# Patient Record
Sex: MALE | Race: WHITE | NOT HISPANIC OR LATINO | Employment: OTHER
[De-identification: names, ages, dates, MRNs, and addresses within clinical notes are randomized per-mention and may not be internally consistent; named-entity substitution may affect disease eponyms.]

---

## 2019-11-03 ENCOUNTER — HEALTH MAINTENANCE LETTER (OUTPATIENT)
Age: 54
End: 2019-11-03

## 2020-11-16 ENCOUNTER — HEALTH MAINTENANCE LETTER (OUTPATIENT)
Age: 55
End: 2020-11-16

## 2021-09-18 ENCOUNTER — HEALTH MAINTENANCE LETTER (OUTPATIENT)
Age: 56
End: 2021-09-18

## 2022-01-08 ENCOUNTER — HEALTH MAINTENANCE LETTER (OUTPATIENT)
Age: 57
End: 2022-01-08

## 2022-11-19 ENCOUNTER — HEALTH MAINTENANCE LETTER (OUTPATIENT)
Age: 57
End: 2022-11-19

## 2023-04-15 ENCOUNTER — HEALTH MAINTENANCE LETTER (OUTPATIENT)
Age: 58
End: 2023-04-15

## 2023-08-11 ENCOUNTER — LAB REQUISITION (OUTPATIENT)
Dept: LAB | Facility: CLINIC | Age: 58
End: 2023-08-11

## 2023-08-11 DIAGNOSIS — Z12.5 ENCOUNTER FOR SCREENING FOR MALIGNANT NEOPLASM OF PROSTATE: ICD-10-CM

## 2023-08-11 DIAGNOSIS — Z13.220 ENCOUNTER FOR SCREENING FOR LIPOID DISORDERS: ICD-10-CM

## 2023-08-11 DIAGNOSIS — Z13.1 ENCOUNTER FOR SCREENING FOR DIABETES MELLITUS: ICD-10-CM

## 2023-08-11 LAB
ALBUMIN SERPL BCG-MCNC: 4.3 G/DL (ref 3.5–5.2)
ALP SERPL-CCNC: 69 U/L (ref 40–129)
ALT SERPL W P-5'-P-CCNC: 19 U/L (ref 0–70)
ANION GAP SERPL CALCULATED.3IONS-SCNC: 12 MMOL/L (ref 7–15)
AST SERPL W P-5'-P-CCNC: 22 U/L (ref 0–45)
BILIRUB SERPL-MCNC: 1 MG/DL
BUN SERPL-MCNC: 12.3 MG/DL (ref 8–23)
CALCIUM SERPL-MCNC: 9.3 MG/DL (ref 8.6–10)
CHLORIDE SERPL-SCNC: 103 MMOL/L (ref 98–107)
CHOLEST SERPL-MCNC: 151 MG/DL
CREAT SERPL-MCNC: 0.92 MG/DL (ref 0.67–1.17)
DEPRECATED HCO3 PLAS-SCNC: 24 MMOL/L (ref 22–29)
GFR SERPL CREATININE-BSD FRML MDRD: >90 ML/MIN/1.73M2
GLUCOSE SERPL-MCNC: 97 MG/DL (ref 70–99)
HDLC SERPL-MCNC: 33 MG/DL
LDLC SERPL CALC-MCNC: 96 MG/DL
NONHDLC SERPL-MCNC: 118 MG/DL
POTASSIUM SERPL-SCNC: 4.2 MMOL/L (ref 3.4–5.3)
PROT SERPL-MCNC: 6.2 G/DL (ref 6.4–8.3)
PSA SERPL DL<=0.01 NG/ML-MCNC: 0.6 NG/ML (ref 0–3.5)
SODIUM SERPL-SCNC: 139 MMOL/L (ref 136–145)
TRIGL SERPL-MCNC: 110 MG/DL

## 2023-08-11 PROCEDURE — 80053 COMPREHEN METABOLIC PANEL: CPT | Performed by: FAMILY MEDICINE

## 2023-08-11 PROCEDURE — 80061 LIPID PANEL: CPT | Performed by: FAMILY MEDICINE

## 2023-08-11 PROCEDURE — G0103 PSA SCREENING: HCPCS | Performed by: FAMILY MEDICINE

## 2024-01-22 ENCOUNTER — OFFICE VISIT (OUTPATIENT)
Dept: INTERNAL MEDICINE | Facility: CLINIC | Age: 59
End: 2024-01-22
Payer: COMMERCIAL

## 2024-01-22 VITALS
HEIGHT: 72 IN | OXYGEN SATURATION: 100 % | HEART RATE: 63 BPM | TEMPERATURE: 98 F | RESPIRATION RATE: 10 BRPM | BODY MASS INDEX: 26.01 KG/M2 | WEIGHT: 192 LBS | DIASTOLIC BLOOD PRESSURE: 64 MMHG | SYSTOLIC BLOOD PRESSURE: 122 MMHG

## 2024-01-22 DIAGNOSIS — H92.02 LEFT EAR PAIN: Primary | ICD-10-CM

## 2024-01-22 DIAGNOSIS — H93.8X2 EAR CONGESTION, LEFT: ICD-10-CM

## 2024-01-22 PROCEDURE — 99203 OFFICE O/P NEW LOW 30 MIN: CPT | Performed by: INTERNAL MEDICINE

## 2024-01-22 RX ORDER — FLUTICASONE PROPIONATE 50 MCG
2 SPRAY, SUSPENSION (ML) NASAL DAILY
Qty: 16 G | Refills: 0 | Status: SHIPPED | OUTPATIENT
Start: 2024-01-22 | End: 2024-02-13

## 2024-01-22 RX ORDER — PSEUDOEPHEDRINE HCL 30 MG
30 TABLET ORAL EVERY 4 HOURS PRN
Qty: 30 TABLET | Refills: 0 | Status: SHIPPED | OUTPATIENT
Start: 2024-01-22 | End: 2024-06-24

## 2024-01-22 NOTE — PROGRESS NOTES
1. Left ear pain  Possibly some eustachian tube dysfunction here.  Trial of Sudafed and fluticasone and see if we can get things feeling better for him.  Will have him meet with ENT if things are not improving.  - Adult ENT  Referral; Future  - pseudoePHEDrine (SUDAFED) 30 MG tablet; Take 1 tablet (30 mg) by mouth every 4 hours as needed for congestion  Dispense: 30 tablet; Refill: 0  - fluticasone (FLONASE) 50 MCG/ACT nasal spray; Spray 2 sprays into both nostrils daily  Dispense: 16 g; Refill: 0    2. Ear congestion, left  As above.  - Adult ENT  Referral; Future  - pseudoePHEDrine (SUDAFED) 30 MG tablet; Take 1 tablet (30 mg) by mouth every 4 hours as needed for congestion  Dispense: 30 tablet; Refill: 0  - fluticasone (FLONASE) 50 MCG/ACT nasal spray; Spray 2 sprays into both nostrils daily  Dispense: 16 g; Refill: 0     Cecil   Simon is a 58 year old, presenting for the following health issues:  Ear Problem (Left ear fullness(use otc ear wax removal)  )      1/22/2024    12:03 PM   Additional Questions   Roomed by MAGO Brody   Accompanied by Self     Ear Problem    History of Present Illness       Reason for visit:  Ear plugged  Symptom onset:  3-4 weeks ago    He eats 4 or more servings of fruits and vegetables daily.He consumes 0 sweetened beverage(s) daily.He exercises with enough effort to increase his heart rate 60 or more minutes per day.  He exercises with enough effort to increase his heart rate 6 days per week.   He is taking medications regularly.       This is a new patient to the clinic.  He recently moved back from [place].  Apparently did see someone over an [place] last year.  He is here today as an urgent add-on for evaluation of left ear pain.  It has been very minimally painful for the last month around his left ear.  He thought maybe he had some wax and tried some wax removal drops.  He also has had some congestion that started with cold about a month ago.  That is  been bothering him.  He notes some diminished hearing on that side.  There is no drainage.  Denies other somatic concerns.  He has been pretty healthy.  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.  For the most part he is retired though.  His family owned the [employer] business.  He has grandchildren here in town which brought him back to the [place]              Objective    /64 (BP Location: Left arm, Patient Position: Sitting, Cuff Size: Adult Regular)   Pulse 63   Temp 98  F (36.7  C) (Tympanic)   Resp 10   Ht 1.829 m (6')   Wt 87.1 kg (192 lb)   SpO2 100%   BMI 26.04 kg/m    Body mass index is 26.04 kg/m .  Physical Exam     Pleasant gentleman.  Little cerumen on the right.  Tympanic membrane look normal.  Left auditory canal normal.  Tympanic membrane is retracted and slightly erythematous.  No evidence of middle ear effusion or purulent effusion.          Signed Electronically by: LIZETTE APARICIO MD

## 2024-02-13 DIAGNOSIS — H92.02 LEFT EAR PAIN: ICD-10-CM

## 2024-02-13 DIAGNOSIS — H93.8X2 EAR CONGESTION, LEFT: ICD-10-CM

## 2024-02-13 RX ORDER — FLUTICASONE PROPIONATE 50 MCG
2 SPRAY, SUSPENSION (ML) NASAL DAILY
Qty: 16 ML | Refills: 0 | Status: SHIPPED | OUTPATIENT
Start: 2024-02-13 | End: 2024-03-04

## 2024-02-16 ENCOUNTER — TELEPHONE (OUTPATIENT)
Dept: INTERNAL MEDICINE | Facility: CLINIC | Age: 59
End: 2024-02-16
Payer: COMMERCIAL

## 2024-02-16 NOTE — TELEPHONE ENCOUNTER
General Call    Contacts         Type Contact Phone/Fax    02/16/2024 10:15 AM CST Phone (Incoming) Elissa Simon Crow (Self) [phone] (H)     Requesting ENT referral be sent to [facility]          Reason for Call:  Referral Request    What are your questions or concerns: Requesting ENT referral be sent to [facility]. Simon was able to find an appointment with sooner timeframes    [facility]  Phone: [phone]  FAX: Unknown    Date of last appointment with provider: 1/22/24    Could we [facility] this information to you [facility] or would you prefer to receive a phone call?:   Patient would prefer a phone call   Okay to leave a detailed message?: Yes at Cell number on file:    Telephone Information:   Mobile [phone]

## 2024-03-03 DIAGNOSIS — H92.02 LEFT EAR PAIN: ICD-10-CM

## 2024-03-03 DIAGNOSIS — H93.8X2 EAR CONGESTION, LEFT: ICD-10-CM

## 2024-03-04 RX ORDER — FLUTICASONE PROPIONATE 50 MCG
2 SPRAY, SUSPENSION (ML) NASAL DAILY
Qty: 16 ML | Refills: 0 | Status: SHIPPED | OUTPATIENT
Start: 2024-03-04 | End: 2024-06-24

## 2024-03-18 DIAGNOSIS — H92.02 LEFT EAR PAIN: ICD-10-CM

## 2024-03-18 DIAGNOSIS — H93.8X2 EAR CONGESTION, LEFT: ICD-10-CM

## 2024-03-19 ENCOUNTER — OFFICE VISIT (OUTPATIENT)
Dept: URGENT CARE | Facility: URGENT CARE | Age: 59
End: 2024-03-19
Payer: COMMERCIAL

## 2024-03-19 VITALS
RESPIRATION RATE: 20 BRPM | SYSTOLIC BLOOD PRESSURE: 130 MMHG | DIASTOLIC BLOOD PRESSURE: 87 MMHG | OXYGEN SATURATION: 98 % | TEMPERATURE: 98 F | HEART RATE: 72 BPM

## 2024-03-19 DIAGNOSIS — H53.451 LOSS OF PERIPHERAL VISUAL FIELD, RIGHT: ICD-10-CM

## 2024-03-19 DIAGNOSIS — H43.391 FLOATERS IN VISUAL FIELD, RIGHT: Primary | ICD-10-CM

## 2024-03-19 PROCEDURE — 99214 OFFICE O/P EST MOD 30 MIN: CPT | Performed by: PHYSICIAN ASSISTANT

## 2024-03-19 RX ORDER — FLUTICASONE PROPIONATE 50 MCG
2 SPRAY, SUSPENSION (ML) NASAL DAILY
Qty: 24 ML | Refills: 1 | OUTPATIENT
Start: 2024-03-19

## 2024-03-19 NOTE — PROGRESS NOTES
Assessment & Plan     Floaters in visual field, right    Patient was on a trip to [place] and noticed right eye visual field loss  He has also noticed floaters in his visual field  Visual field deficit in right medial lower field or right eye    There is concern for retinal detachment and secondary floaters  I have made an appt with ophthalmology for visit today to have slit lamp    - Adult Eye  Referral; Future    Loss of peripheral visual field, right    Referral for emergent eye exam due to concern of retinal tear    - Adult Eye  Referral; Future    Review of external notes as documented elsewhere in note  BMI  Estimated body mass index is 26.04 kg/m  as calculated from the following:    Height as of 1/22/24: 1.829 m (6').    Weight as of 1/22/24: 87.1 kg (192 lb).       CONSULTATION/REFERRAL to Ophthalmology today     No follow-ups on file.    Cecil Montes is a 58 year old, presenting for the following health issues:  Urgent Care (R eye blurry /Pt see black spots/Pt does not use glasses /R 20/20 L20/20 Both 20/20 Normal /X1wk )    HPI     Review of Systems  Constitutional, HEENT, cardiovascular, pulmonary, gi and gu systems are negative, except as otherwise noted.      Objective    /87   Pulse 72   Temp 98  F (36.7  C) (Tympanic)   Resp 20   SpO2 98%   There is no height or weight on file to calculate BMI.  Physical Exam   GENERAL: alert and no distress  EYES: Eyes grossly normal to inspection, PERRL and conjunctivae and sclerae normal  SKIN: no suspicious lesions or rashes  NEURO: Normal strength and tone, mentation intact and speech normal  PSYCH: mentation appears normal, affect normal/bright            Signed Electronically by: Demarcus Dior, [facility], PA-C

## 2024-03-19 NOTE — PATIENT INSTRUCTIONS
[facility], [address], [address], [place], [place], [facility]    [phone]    APT TIME 2:10 pm

## 2024-03-19 NOTE — PROGRESS NOTES
[facility], [address], [address], [place], [place], [facility]    [phone] 2:10 pm

## 2024-03-20 ENCOUNTER — TELEPHONE (OUTPATIENT)
Dept: OPHTHALMOLOGY | Facility: CLINIC | Age: 59
End: 2024-03-20
Payer: COMMERCIAL

## 2024-03-21 ENCOUNTER — ANESTHESIA EVENT (OUTPATIENT)
Dept: SURGERY | Facility: CLINIC | Age: 59
End: 2024-03-21
Payer: COMMERCIAL

## 2024-03-21 ENCOUNTER — HOSPITAL ENCOUNTER (OUTPATIENT)
Facility: CLINIC | Age: 59
Discharge: HOME | End: 2024-03-21
Attending: OPHTHALMOLOGY | Admitting: OPHTHALMOLOGY
Payer: COMMERCIAL

## 2024-03-21 ENCOUNTER — ANESTHESIA (OUTPATIENT)
Dept: SURGERY | Facility: CLINIC | Age: 59
End: 2024-03-21
Payer: COMMERCIAL

## 2024-03-21 VITALS
OXYGEN SATURATION: 99 % | DIASTOLIC BLOOD PRESSURE: 79 MMHG | HEART RATE: 68 BPM | HEIGHT: 73 IN | BODY MASS INDEX: 24.86 KG/M2 | WEIGHT: 187.61 LBS | RESPIRATION RATE: 14 BRPM | TEMPERATURE: 98.4 F | SYSTOLIC BLOOD PRESSURE: 124 MMHG

## 2024-03-21 DIAGNOSIS — H33.21 RIGHT RETINAL DETACHMENT: ICD-10-CM

## 2024-03-21 DIAGNOSIS — Z48.810 AFTERCARE FOLLOWING SURGERY OF A SENSE ORGAN: Primary | ICD-10-CM

## 2024-03-21 PROCEDURE — 360N000077 HC SURGERY LEVEL 4, PER MIN: Performed by: OPHTHALMOLOGY

## 2024-03-21 PROCEDURE — 250N000013 HC RX MED GY IP 250 OP 250 PS 637: Performed by: OPHTHALMOLOGY

## 2024-03-21 PROCEDURE — 250N000011 HC RX IP 250 OP 636: Performed by: NURSE ANESTHETIST, CERTIFIED REGISTERED

## 2024-03-21 PROCEDURE — 67108 REPAIR DETACHED RETINA: CPT | Performed by: NURSE ANESTHETIST, CERTIFIED REGISTERED

## 2024-03-21 PROCEDURE — 250N000009 HC RX 250: Performed by: NURSE ANESTHETIST, CERTIFIED REGISTERED

## 2024-03-21 PROCEDURE — 710N000010 HC RECOVERY PHASE 1, LEVEL 2, PER MIN: Performed by: OPHTHALMOLOGY

## 2024-03-21 PROCEDURE — 250N000025 HC SEVOFLURANE, PER MIN: Performed by: OPHTHALMOLOGY

## 2024-03-21 PROCEDURE — 67108 REPAIR DETACHED RETINA: CPT | Mod: RT | Performed by: OPHTHALMOLOGY

## 2024-03-21 PROCEDURE — 250N000011 HC RX IP 250 OP 636: Performed by: OPHTHALMOLOGY

## 2024-03-21 PROCEDURE — 710N000012 HC RECOVERY PHASE 2, PER MINUTE: Performed by: OPHTHALMOLOGY

## 2024-03-21 PROCEDURE — 250N000009 HC RX 250: Performed by: OPHTHALMOLOGY

## 2024-03-21 PROCEDURE — 999N000141 HC STATISTIC PRE-PROCEDURE NURSING ASSESSMENT: Performed by: OPHTHALMOLOGY

## 2024-03-21 PROCEDURE — 272N000001 HC OR GENERAL SUPPLY STERILE: Performed by: OPHTHALMOLOGY

## 2024-03-21 PROCEDURE — 370N000017 HC ANESTHESIA TECHNICAL FEE, PER MIN: Performed by: OPHTHALMOLOGY

## 2024-03-21 PROCEDURE — 250N000009 HC RX 250: Performed by: STUDENT IN AN ORGANIZED HEALTH CARE EDUCATION/TRAINING PROGRAM

## 2024-03-21 PROCEDURE — C1784 OCULAR DEV, INTRAOP, DET RET: HCPCS | Performed by: OPHTHALMOLOGY

## 2024-03-21 PROCEDURE — 67108 REPAIR DETACHED RETINA: CPT | Performed by: ANESTHESIOLOGY

## 2024-03-21 PROCEDURE — 258N000003 HC RX IP 258 OP 636: Performed by: NURSE ANESTHETIST, CERTIFIED REGISTERED

## 2024-03-21 DEVICE — EYE IMP STRIP STYLE 4050 S4050: Type: IMPLANTABLE DEVICE | Site: EYE | Status: FUNCTIONAL

## 2024-03-21 DEVICE — EYE IMP SLEEVE OVAL STYLE 3084 S3084: Type: IMPLANTABLE DEVICE | Site: EYE | Status: FUNCTIONAL

## 2024-03-21 RX ORDER — ONDANSETRON 2 MG/ML
4 INJECTION INTRAMUSCULAR; INTRAVENOUS EVERY 30 MIN PRN
Status: DISCONTINUED | OUTPATIENT
Start: 2024-03-21 | End: 2024-03-26 | Stop reason: HOSPADM

## 2024-03-21 RX ORDER — PROPOFOL 10 MG/ML
INJECTION, EMULSION INTRAVENOUS PRN
Status: DISCONTINUED | OUTPATIENT
Start: 2024-03-21 | End: 2024-03-21

## 2024-03-21 RX ORDER — DEXAMETHASONE SODIUM PHOSPHATE 4 MG/ML
INJECTION, SOLUTION INTRA-ARTICULAR; INTRALESIONAL; INTRAMUSCULAR; INTRAVENOUS; SOFT TISSUE PRN
Status: DISCONTINUED | OUTPATIENT
Start: 2024-03-21 | End: 2024-03-21

## 2024-03-21 RX ORDER — BUPIVACAINE HYDROCHLORIDE 7.5 MG/ML
INJECTION, SOLUTION EPIDURAL; RETROBULBAR PRN
Status: DISCONTINUED | OUTPATIENT
Start: 2024-03-21 | End: 2024-03-21 | Stop reason: HOSPADM

## 2024-03-21 RX ORDER — FENTANYL CITRATE 50 UG/ML
25 INJECTION, SOLUTION INTRAMUSCULAR; INTRAVENOUS
Status: DISCONTINUED | OUTPATIENT
Start: 2024-03-21 | End: 2024-03-26 | Stop reason: HOSPADM

## 2024-03-21 RX ORDER — ATROPINE SULFATE 10 MG/ML
SOLUTION/ DROPS OPHTHALMIC PRN
Status: DISCONTINUED | OUTPATIENT
Start: 2024-03-21 | End: 2024-03-21 | Stop reason: HOSPADM

## 2024-03-21 RX ORDER — OFLOXACIN 3 MG/ML
1 SOLUTION/ DROPS OPHTHALMIC 4 TIMES DAILY
Qty: 5 ML | Refills: 0 | Status: SHIPPED | OUTPATIENT
Start: 2024-03-21 | End: 2024-06-24

## 2024-03-21 RX ORDER — HYDRALAZINE HYDROCHLORIDE 20 MG/ML
2.5-5 INJECTION INTRAMUSCULAR; INTRAVENOUS EVERY 10 MIN PRN
Status: DISCONTINUED | OUTPATIENT
Start: 2024-03-21 | End: 2024-03-26 | Stop reason: HOSPADM

## 2024-03-21 RX ORDER — FENTANYL CITRATE 50 UG/ML
25 INJECTION, SOLUTION INTRAMUSCULAR; INTRAVENOUS EVERY 5 MIN PRN
Status: DISCONTINUED | OUTPATIENT
Start: 2024-03-21 | End: 2024-03-26 | Stop reason: HOSPADM

## 2024-03-21 RX ORDER — SODIUM CHLORIDE, SODIUM LACTATE, POTASSIUM CHLORIDE, CALCIUM CHLORIDE 600; 310; 30; 20 MG/100ML; MG/100ML; MG/100ML; MG/100ML
INJECTION, SOLUTION INTRAVENOUS CONTINUOUS PRN
Status: DISCONTINUED | OUTPATIENT
Start: 2024-03-21 | End: 2024-03-21

## 2024-03-21 RX ORDER — POLYMYXIN B SULFATE AND TRIMETHOPRIM 1; 10000 MG/ML; [USP'U]/ML
SOLUTION OPHTHALMIC PRN
Status: DISCONTINUED | OUTPATIENT
Start: 2024-03-21 | End: 2024-03-21 | Stop reason: HOSPADM

## 2024-03-21 RX ORDER — OXYCODONE HYDROCHLORIDE 5 MG/1
5 TABLET ORAL EVERY 4 HOURS PRN
Status: DISCONTINUED | OUTPATIENT
Start: 2024-03-21 | End: 2024-03-26 | Stop reason: HOSPADM

## 2024-03-21 RX ORDER — PROPARACAINE HYDROCHLORIDE 5 MG/ML
1 SOLUTION/ DROPS OPHTHALMIC ONCE
Status: DISCONTINUED | OUTPATIENT
Start: 2024-03-21 | End: 2024-03-21 | Stop reason: HOSPADM

## 2024-03-21 RX ORDER — PREDNISOLONE ACETATE 10 MG/ML
1 SUSPENSION/ DROPS OPHTHALMIC 4 TIMES DAILY
Qty: 5 ML | Refills: 1 | Status: SHIPPED | OUTPATIENT
Start: 2024-03-21 | End: 2024-06-24

## 2024-03-21 RX ORDER — OXYCODONE HYDROCHLORIDE 10 MG/1
10 TABLET ORAL EVERY 4 HOURS PRN
Status: DISCONTINUED | OUTPATIENT
Start: 2024-03-21 | End: 2024-03-26 | Stop reason: HOSPADM

## 2024-03-21 RX ORDER — SODIUM CHLORIDE, SODIUM LACTATE, POTASSIUM CHLORIDE, CALCIUM CHLORIDE 600; 310; 30; 20 MG/100ML; MG/100ML; MG/100ML; MG/100ML
INJECTION, SOLUTION INTRAVENOUS CONTINUOUS
Status: DISCONTINUED | OUTPATIENT
Start: 2024-03-21 | End: 2024-03-26 | Stop reason: HOSPADM

## 2024-03-21 RX ORDER — HYDROMORPHONE HYDROCHLORIDE 1 MG/ML
0.4 INJECTION, SOLUTION INTRAMUSCULAR; INTRAVENOUS; SUBCUTANEOUS EVERY 5 MIN PRN
Status: DISCONTINUED | OUTPATIENT
Start: 2024-03-21 | End: 2024-03-26 | Stop reason: HOSPADM

## 2024-03-21 RX ORDER — NALOXONE HYDROCHLORIDE 0.4 MG/ML
0.1 INJECTION, SOLUTION INTRAMUSCULAR; INTRAVENOUS; SUBCUTANEOUS
Status: DISCONTINUED | OUTPATIENT
Start: 2024-03-21 | End: 2024-03-26 | Stop reason: HOSPADM

## 2024-03-21 RX ORDER — CYCLOPENTOLAT/TROPIC/PHENYLEPH 1%-1%-2.5%
1 DROPS (EA) OPHTHALMIC (EYE)
Status: DISCONTINUED | OUTPATIENT
Start: 2024-03-21 | End: 2024-03-21 | Stop reason: HOSPADM

## 2024-03-21 RX ORDER — ONDANSETRON 2 MG/ML
INJECTION INTRAMUSCULAR; INTRAVENOUS PRN
Status: DISCONTINUED | OUTPATIENT
Start: 2024-03-21 | End: 2024-03-21

## 2024-03-21 RX ORDER — HYDROMORPHONE HYDROCHLORIDE 1 MG/ML
0.2 INJECTION, SOLUTION INTRAMUSCULAR; INTRAVENOUS; SUBCUTANEOUS EVERY 5 MIN PRN
Status: DISCONTINUED | OUTPATIENT
Start: 2024-03-21 | End: 2024-03-26 | Stop reason: HOSPADM

## 2024-03-21 RX ORDER — FENTANYL CITRATE 50 UG/ML
50 INJECTION, SOLUTION INTRAMUSCULAR; INTRAVENOUS EVERY 5 MIN PRN
Status: DISCONTINUED | OUTPATIENT
Start: 2024-03-21 | End: 2024-03-26 | Stop reason: HOSPADM

## 2024-03-21 RX ORDER — FENTANYL CITRATE 50 UG/ML
INJECTION, SOLUTION INTRAMUSCULAR; INTRAVENOUS PRN
Status: DISCONTINUED | OUTPATIENT
Start: 2024-03-21 | End: 2024-03-21

## 2024-03-21 RX ORDER — LIDOCAINE HYDROCHLORIDE 20 MG/ML
INJECTION, SOLUTION INFILTRATION; PERINEURAL PRN
Status: DISCONTINUED | OUTPATIENT
Start: 2024-03-21 | End: 2024-03-21

## 2024-03-21 RX ORDER — METOPROLOL TARTRATE 1 MG/ML
1-2 INJECTION, SOLUTION INTRAVENOUS EVERY 5 MIN PRN
Status: DISCONTINUED | OUTPATIENT
Start: 2024-03-21 | End: 2024-03-26 | Stop reason: HOSPADM

## 2024-03-21 RX ORDER — DEXMEDETOMIDINE HYDROCHLORIDE 4 UG/ML
INJECTION, SOLUTION INTRAVENOUS PRN
Status: DISCONTINUED | OUTPATIENT
Start: 2024-03-21 | End: 2024-03-21

## 2024-03-21 RX ORDER — ONDANSETRON 4 MG/1
4 TABLET, ORALLY DISINTEGRATING ORAL EVERY 30 MIN PRN
Status: DISCONTINUED | OUTPATIENT
Start: 2024-03-21 | End: 2024-03-26 | Stop reason: HOSPADM

## 2024-03-21 RX ORDER — KETOROLAC TROMETHAMINE 30 MG/ML
INJECTION, SOLUTION INTRAMUSCULAR; INTRAVENOUS PRN
Status: DISCONTINUED | OUTPATIENT
Start: 2024-03-21 | End: 2024-03-21

## 2024-03-21 RX ORDER — CYCLOPENTOLAT/TROPIC/PHENYLEPH 1%-1%-2.5%
DROPS (EA) OPHTHALMIC (EYE) PRN
Status: DISCONTINUED | OUTPATIENT
Start: 2024-03-21 | End: 2024-03-21 | Stop reason: HOSPADM

## 2024-03-21 RX ORDER — BALANCED SALT SOLUTION 6.4; .75; .48; .3; 3.9; 1.7 MG/ML; MG/ML; MG/ML; MG/ML; MG/ML; MG/ML
SOLUTION OPHTHALMIC PRN
Status: DISCONTINUED | OUTPATIENT
Start: 2024-03-21 | End: 2024-03-21 | Stop reason: HOSPADM

## 2024-03-21 RX ADMIN — LIDOCAINE HYDROCHLORIDE 100 MG: 20 INJECTION, SOLUTION INFILTRATION; PERINEURAL at 11:49

## 2024-03-21 RX ADMIN — PHENYLEPHRINE HYDROCHLORIDE 100 MCG: 10 INJECTION INTRAVENOUS at 12:10

## 2024-03-21 RX ADMIN — Medication 1 DROP: at 11:38

## 2024-03-21 RX ADMIN — PHENYLEPHRINE HYDROCHLORIDE 100 MCG: 10 INJECTION INTRAVENOUS at 12:05

## 2024-03-21 RX ADMIN — PROPOFOL 150 MG: 10 INJECTION, EMULSION INTRAVENOUS at 11:49

## 2024-03-21 RX ADMIN — FENTANYL CITRATE 25 MCG: 50 INJECTION INTRAMUSCULAR; INTRAVENOUS at 12:46

## 2024-03-21 RX ADMIN — DEXMEDETOMIDINE 4 MCG: 100 INJECTION, SOLUTION, CONCENTRATE INTRAVENOUS at 11:41

## 2024-03-21 RX ADMIN — DEXAMETHASONE SODIUM PHOSPHATE 6 MG: 4 INJECTION, SOLUTION INTRA-ARTICULAR; INTRALESIONAL; INTRAMUSCULAR; INTRAVENOUS; SOFT TISSUE at 11:49

## 2024-03-21 RX ADMIN — KETOROLAC TROMETHAMINE 30 MG: 30 INJECTION, SOLUTION INTRAMUSCULAR at 11:49

## 2024-03-21 RX ADMIN — FENTANYL CITRATE 25 MCG: 50 INJECTION INTRAMUSCULAR; INTRAVENOUS at 12:22

## 2024-03-21 RX ADMIN — DEXMEDETOMIDINE 4 MCG: 100 INJECTION, SOLUTION, CONCENTRATE INTRAVENOUS at 12:58

## 2024-03-21 RX ADMIN — SODIUM CHLORIDE, POTASSIUM CHLORIDE, SODIUM LACTATE AND CALCIUM CHLORIDE: 600; 310; 30; 20 INJECTION, SOLUTION INTRAVENOUS at 11:33

## 2024-03-21 RX ADMIN — FENTANYL CITRATE 50 MCG: 50 INJECTION INTRAMUSCULAR; INTRAVENOUS at 11:48

## 2024-03-21 RX ADMIN — Medication 1 DROP: at 11:36

## 2024-03-21 RX ADMIN — MIDAZOLAM 2 MG: 1 INJECTION INTRAMUSCULAR; INTRAVENOUS at 11:40

## 2024-03-21 RX ADMIN — ONDANSETRON 4 MG: 2 INJECTION INTRAMUSCULAR; INTRAVENOUS at 13:49

## 2024-03-21 RX ADMIN — PROPOFOL 50 MG: 10 INJECTION, EMULSION INTRAVENOUS at 11:52

## 2024-03-21 ASSESSMENT — ACTIVITIES OF DAILY LIVING (ADL)
ADLS_ACUITY_SCORE: 35

## 2024-03-21 NOTE — DISCHARGE INSTRUCTIONS
POST-OPERATIVE INSTRUCTIONS FOLLOWING RETINA SURGERY    Carissa Hernandez MD, PhD  Department of Ophthalmology  [facility]  [phone]      ACTIVITY:  No heavy lifting after surgery  Keep the bandage in place until you are seen tomorrow at the Eye Clinic in the [facility] ([facility]), on the 9th floor.  The clinic street address for [facility] is [address] [place] [address]  The [place] patient parking garage is 1 block away at [address] [facility].  Do not get your bandage wet.      GAS BUBBLE POSITIONING REQUIREMENTS  Positioning requirements will be discussed with you if you have an oil or gas bubble in your eye:    Position:    Face Down    The duration  will be confirmed at the clinic visit tomorrow.    Sleeping face down can be challenging.  One method is to sleep on your stomach with your head hanging over the edge of the bed with a chair there to rest your head on.  Alternatively, you can sleep with your chest laying on top of a large pile of pillows with your head hanging over.  Alternatively, you can place 2 rows of firm pillows side by side with a gap in between.  Sleep on top of the pillows with your head in the gap.  Alternatively, it is also possible to rent positioning equipment from medical supply stores. This typically costs $150-200 per week. Insurance usually does not cover the cost.  Often, I have found patients prefer the pillows they set up themselves to the rented equipment.    When positioning, it is OK to take brief breaks to eat, stretch, clean up, etc.  Try to position for 90% of the time (5 minute break per hour on average).  Do not lay flat on your back until the bubble is gone.  Do not fly on an airplane or travel to elevations more than 1000 feet above [place] until the bubble is gone.  Keep the green bracelet on your wrist until the bubble is gone.  If it breaks, we can give you a replacement        EYE DROPS  Drops will be given to you after the  surgery.  They DO NOT need to be used until after you are seen in clinic.  DO NOT disturb your bandage the first night.      When using more than one drop, separate them by 3 minutes between drops.  Common times to place drops are breakfast, lunch, dinner, and bedtime.  Do not stop your drops without discussing with our office.  If you run out before your appointment, call and we will send in a refill.    ofloxacin --- 4 times per day  Pred Forte (prednisolone acetate) --- 4 times per day      PAIN MEDICATION   It is common to have some mild or moderate discomfort after eye surgery.  Tylenol or ibuprofen may be taken if you don't have any other general health conditions that prevent you from taking these.      WHAT TO EXPECT  It is common for the eye to to have a blood tinged discharge for a few days after surgery  It may feel irritated (as if something were in your eye), for there to be clear discharge (thicker in the mornings upon awakening), and for it to be bloodshot for 2-3 weeks following retina surgery.   Your vision is also commonly decreased during this time due to the bubble.          WHAT TO WATCH OUT FOR  If you experience any of the following, you should call immediately:  Increasing pain  Increasing nausea or vomiting  Increasing redness  Worsening or darkening of the vision  New flashing lights or floaters      For any of the symptoms listed above, or for other concerns, call [phone] and ask to speak to the clinic nurse.  If you call after hours, follow to prompts to reach the doctor on call.          Same-Day Surgery   Adult Discharge Orders & Instructions     For 24 hours after surgery:  Get plenty of rest.  A responsible adult must stay with you for at least 24 hours after you leave the hospital.   Pain medication can slow your reflexes. Do not drive or use heavy equipment.  If you have weakness or tingling, don't drive or use heavy equipment until this feeling goes away.  Mixing alcohol and  pain medication can cause dizziness and slow your breathing. It can even be fatal. Do not drink alcohol while taking pain medication.  Avoid strenuous or risky activities.  Ask for help when climbing stairs.   You may feel lightheaded.  If so, sit for a few minutes before standing.  Have someone help you get up.   If you have nausea (feel sick to your stomach), drink only clear liquids such as apple juice, ginger ale, broth or 7-Up.  Rest may also help.  Be sure to drink enough fluids.  Move to a regular diet as you feel able. Take pain medications with a small amount of solid food, such as toast or crackers, to avoid nausea.   A slight fever is normal. Call the doctor if your fever is over 100 F (37.7 C) (taken under the tongue) or lasts longer than 24 hours.  You may have a dry mouth, muscle aches, trouble sleeping or a sore throat.  These symptoms should go away after 24 hours.  Do not make important or legal decisions.   Pain Management:      1. Take pain medication (if prescribed) for pain as directed by your physician.        2. WARNING: If the pain medication you have been prescribed contains Tylenol  (acetaminophen), DO NOT take additional doses of Tylenol (acetaminophen).     Call your doctor for any of the followin.  Signs of infection (fever, growing tenderness at the surgery site, severe pain, a large amount of drainage or bleeding, foul-smelling drainage, redness, swelling).    2.  It has been over 8 to 10 hours since surgery and you are still not able to urinate (pee).    3.  Headache for over 24 hours.    4.  Numbness, tingling or weakness the day after surgery (if you had spinal anesthesia).  To contact a doctor, call _______Dr. Hernandez, Eye Clinic at [phone] ______________________________ or:  '   [phone] and ask for the Resident On Call for:          _________Ophthamology_________________________________ (answered 24 hours a day)  '   Emergency Department:  [facility]  [facility]: [phone]  [place] [facility]: [phone]

## 2024-03-21 NOTE — ANESTHESIA POSTPROCEDURE EVALUATION
Patient: Simon Bowers    Procedure: Procedure(s):  Vitrectomy parsplana with 25 gauge system, Endolaser, SF6 Gas bubble infusion  Scleral buckle       Anesthesia Type:  General    Note:  Disposition: Outpatient   Postop Pain Control: Uneventful            Sign Out: Well controlled pain   PONV: No   Neuro/Psych: Uneventful            Sign Out: Acceptable/Baseline neuro status   Airway/Respiratory: Uneventful            Sign Out: Acceptable/Baseline resp. status   CV/Hemodynamics: Uneventful            Sign Out: Acceptable CV status; No obvious hypovolemia; No obvious fluid overload   Other NRE: NONE   DID A NON-ROUTINE EVENT OCCUR? No       Last vitals:  Vitals Value Taken Time   BP 97/52 03/21/24 1435   Temp 37  C (98.6  F) 03/21/24 1404   Pulse 72 03/21/24 1435   Resp 11 03/21/24 1423   SpO2 97 % 03/21/24 1443   Vitals shown include unfiled device data.    Electronically Signed By: Mary Kate Otto MD  March 21, 2024  2:46 PM

## 2024-03-21 NOTE — TELEPHONE ENCOUNTER
I spoke with the patient over the phone.     I discussed the risks, benefits, and alternatives of retinal detachment surgery with the patient.  I explained that with surgery there was approximately a 70 to 80 percent chance of success and that the surgery might fail due to formation of fibrosis or other postoperative problems.  I explained that if the surgery failed, additional surgeries might be needed. I explained that retinal detachments cause vision loss and that even with a successful result from the surgery, the vision might not return to its prior level.  I explained that if there were subsequent re-detachments, even more vision might be lost.  I explained that with a gas bubble in the eye, a particular head position after surgery including face-down might be necessary for a week or more.  I also explained that airplane travel or high altitudes would have to be avoided for up to three months after surgery.      I explained that an oil bubble could be placed instead, and that it would not require such strict positioning or travel restrictions. However, an oil bubble would require further surgeries to be removed.    I explained that with a scleral buckle around the eye, double vision may develop after surgery.  This double vision usually resolves, but it might require either special glasses, further surgeries, or could even be intractable and permanent.    I explained that the surgery would accelerate the development of their cataract and that they would need cataract surgery much sooner than they would otherwise have needed it.  I explained there was a small chance I might have to remove their lens during my surgery.       I explained that this is a training facility, and residents or fellows might participate in the surgery under supervision.    The patient understood the risks, benefits, and alternatives, and elected to proceed.

## 2024-03-21 NOTE — BRIEF OP NOTE
[facility]    Brief Operative Note    Pre-operative diagnosis: Right retinal detachment [H33.21]  Post-operative diagnosis Same as pre-operative diagnosis    Procedure: Vitrectomy parsplana with 25 gauge system, Endolaser, SF6 Gas bubble infusion, Right - Eye  Scleral buckle, Right - Eye    Surgeon: Surgeon(s) and Role:     * Carissa Hernandez MD - Primary     * Cory Ty MD - Resident - Assisting  Anesthesia: General   Estimated Blood Loss: Minimal    Drains: None  Specimens: * No specimens in log *  Findings:   None.  Complications: None.  Implants:   Implant Name Type Inv. Item Serial No.  Lot No. LRB No. Used Action   EYE IMP STRIP STYLE 4050  - [id number] Lens/Eye Implant EYE IMP STRIP STYLE 4050   [place] [employer] [id number] Right 1 Implanted   EYE IMP SLEEVE OVAL STYLE 3084  - [id number] Lens/Eye Implant EYE IMP SLEEVE OVAL STYLE 3084   [employer] [employer] [id number] Right 1 Implanted

## 2024-03-21 NOTE — OP NOTE
PRE-OP Dx:   1) RD right eye      Post-OP Dx:   1) same    Attending:  ELANA Hernandez MD,PhD  Fellow:     BERTRAM Ty MD, MPH    Anesthesia:  MAC + RB  Procedure (ALL OD):    1) Pars plana vitrectomy (PPV) 25g   2) SBP #4050   3) FGx 20% SF6   4) retinal detachment repair      Findings: RD with breaks at 11    EBL: scant  Specimens: none  Complications: none      Procedure Description:     Simon Bowers is a AGE: 58 year old patient with a history of RRD OD.  After informed consent was obtained, he was brought into the OR where general anesthesia was administered.  The eye was then prepped and draped in the usual fashion for ophthalmic surgery.    A 360 degree conjunctival peritomy was created and the quadrants cleared with the lira scissors.  The muscles were isolated and looped with 2-0 silk sutures.  Next 5-0 nylon suture was used to create horizontal mattress sutures in each quadrant.  A #[id number] band  was then threaded under the muscles and the mattress sutures and secured with a #3084 sleeve in the superonasal quadrant.   The mattress sutures were tied except for the one over the sleeve.    Attention was then turned to the vitrectomy.  Marks were made on the sclera inferotemporally, superotemporally, and superonasally 4.0 mm posterior to the limbus.  The 25g transscleral cannulas were inserted through the sclera using the trocars.  The infusion cannula was connected to the inferonasal cannula and directly visualized to verify it was in the correct location.  A core vitrectomy was performed and the vitreous was stained with kenalog.  The periphery was trimmed with depression.  A retinal  break  was found at 10 in the periphery on the buckle and a suspicious region SN near the cannula.  Traction was removed and all breaks were marked with diathermy.  A posterior drainage retinotomy was created at 11:00.      Next a fluid air exchange was done with the soft-tip cannula and the light pipe.  Laser was placed  around all marked breaks, the retinotomy, and posterior to the SN canula.      The final buckle sutures were tied and the ends trimmed.  The buckle height was verified to be appropriate.  An air-gas exchange was done with 20% SF6 and the cannulas were removed.  The sclerotomies were sutured as needed with 7-0 vicryl and were tight.  The pressure was checked and verified to be appropriate.  The buckle was rinsed with polytrim solution and the silk sutures removed.  The conjunctiva was closed with 6-0 plain suture.  A drop of atropine was placed in the eye with erythromycin ointment.  A pad and escobedo shield were taped over the eye.    The patient left the OR with no complications.    The surgery was assisted by Dr. Cory Ty, because no qualified resident was available on the day of the surgery. Due to the delicate and complex nature of this surgery, Dr. Ty was required. Dr. Ty assisted with the entire procedure. I was present for the entire surgery.    Carissa Hernandez MD, PhD

## 2024-03-21 NOTE — ANESTHESIA PROCEDURE NOTES
Airway       Patient location during procedure: OR  Staff -        CRNA: Kvng Escobar APRN CRNA       Performed By: CRNA  Consent for Airway        Urgency: elective  Indications and Patient Condition       Indications for airway management: stefania-procedural       Induction type:intravenous       Mask difficulty assessment: 0 - not attempted    Final Airway Details       Final airway type: supraglottic airway    Supraglottic Airway Details        Type: LMA       Brand: Air-Q       LMA size: 5    Post intubation assessment        Placement verified by: capnometry, equal breath sounds and chest rise        Number of attempts at approach: 1       Secured with: tape       Ease of procedure: easy       Dentition: Intact and Unchanged

## 2024-03-21 NOTE — OR NURSING
Green wrist band placed on right arm of patient due to 20% SF6 gas placed in right eye during surgery.  Lot #[id number], Exp 01/31/2025

## 2024-03-21 NOTE — ANESTHESIA CARE TRANSFER NOTE
Patient: Simon Bowers    Procedure: Procedure(s):  Vitrectomy parsplana with 25 gauge system, Endolaser, SF6 Gas bubble infusion  Scleral buckle       Diagnosis: Right retinal detachment [H33.21]  Diagnosis Additional Information: No value filed.    Anesthesia Type:   General     Note:    Oropharynx: oropharynx clear of all foreign objects and spontaneously breathing  Level of Consciousness: drowsy  Oxygen Supplementation: face mask  Level of Supplemental Oxygen (L/min / FiO2): 8  Independent Airway: airway patency satisfactory and stable  Dentition: dentition unchanged  Vital Signs Stable: post-procedure vital signs reviewed and stable  Report to RN Given: handoff report given  Patient transferred to: PACU    Handoff Report: Identifed the Patient, Identified the Reponsible Provider, Reviewed the pertinent medical history, Discussed the surgical course, Reviewed Intra-OP anesthesia mangement and issues during anesthesia, Set expectations for post-procedure period and Allowed opportunity for questions and acknowledgement of understanding      Vitals:  Vitals Value Taken Time   /71 03/21/24 1404   Temp 37    Pulse 73 03/21/24 1410   Resp 14 03/21/24 1410   SpO2 100 % 03/21/24 1410   Vitals shown include unfiled device data.    Electronically Signed By: MARIA DEL CARMEN De León CRNA  March 21, 2024  2:11 PM

## 2024-03-21 NOTE — ANESTHESIA PREPROCEDURE EVALUATION
Anesthesia Pre-Procedure Evaluation    Patient: Simon Bowers   MRN: [id number] : 1965        Procedure : Procedure(s):  Vitrectomy parsplana with 25 gauge system, Endolaser  Intravitreal injection gas vs oil  Scleral buckle          Past Medical History:   Diagnosis Date    Bell's palsy     History of chronic left facial numbness, probably secondary to Bell's     History of paresthesias evaluated at [place] and reassured.       Past Surgical History:   Procedure Laterality Date    HC KNEE SCOPE,MED/LAT MENISECTOMY  09    LT    HC UGI ENDOSCOPY DIAG W OR W/O BRUSH/WASH  11      Allergies   Allergen Reactions    No Known Drug Allergy     Seasonal Allergies      Not affecting him anymore      Social History     Tobacco Use    Smoking status: Never    Smokeless tobacco: Never    Tobacco comments:     No 2nd hand   Substance Use Topics    Alcohol use: Yes     Comment: BEER/1-2 X PER MONTH or more      Wt Readings from Last 1 Encounters:   24 85.1 kg (187 lb 9.8 oz)        Anesthesia Evaluation            ROS/MED HX  ENT/Pulmonary:       Neurologic: Comment: Colp palsy      Cardiovascular:       METS/Exercise Tolerance:     Hematologic:       Musculoskeletal:       GI/Hepatic:       Renal/Genitourinary:       Endo:       Psychiatric/Substance Use:       Infectious Disease:       Malignancy:       Other:               OUTSIDE LABS:  CBC:   Lab Results   Component Value Date    WBC 5.1 2004    HGB 14.8 2009    HGB 14.0 2004    HCT 41.7 2004     2004     BMP:   Lab Results   Component Value Date     2023     2004    POTASSIUM 4.2 2023    POTASSIUM 3.8 2004    CHLORIDE 103 2023    CHLORIDE 103 2004    CO2 24 2023    CO2 26 2004    BUN 12.3 2023    BUN 14 2004    CR 0.92 2023    CR 0.80 2004    GLC 97 2023     (H) 2012     COAGS: No results found for:  "\"PTT\", \"INR\", \"FIBR\"  POC: No results found for: \"BGM\", \"HCG\", \"HCGS\"  HEPATIC:   Lab Results   Component Value Date    ALBUMIN 4.3 08/11/2023    PROTTOTAL 6.2 (L) 08/11/2023    ALT 19 08/11/2023    AST 22 08/11/2023    ALKPHOS 69 08/11/2023    BILITOTAL 1.0 08/11/2023     OTHER:   Lab Results   Component Value Date    PH 6.5 03/06/2003    TEVIN 9.3 08/11/2023    TSH 0.69 07/11/2012       Anesthesia Plan    ASA Status:  1       Anesthesia Type: General.     - Airway: LMA   Induction: Intravenous, Propofol.   Maintenance: Balanced.        Consents            Postoperative Care    Pain management: IV analgesics, Oral pain medications, Multi-modal analgesia.   PONV prophylaxis: Ondansetron (or other 5HT-3), Dexamethasone or Solumedrol, Background Propofol Infusion     Comments:               Ronald Marie MD    I have reviewed the pertinent notes and labs in the chart from the past 30 days and (re)examined the patient.  Any updates or changes from those notes are reflected in this note.                  "

## 2024-03-22 ENCOUNTER — OFFICE VISIT (OUTPATIENT)
Dept: OPHTHALMOLOGY | Facility: CLINIC | Age: 59
End: 2024-03-22
Attending: OPHTHALMOLOGY
Payer: COMMERCIAL

## 2024-03-22 DIAGNOSIS — Z98.890 POST-OPERATIVE STATE: Primary | ICD-10-CM

## 2024-03-22 PROCEDURE — G0463 HOSPITAL OUTPT CLINIC VISIT: HCPCS | Performed by: OPHTHALMOLOGY

## 2024-03-22 PROCEDURE — 99024 POSTOP FOLLOW-UP VISIT: CPT | Performed by: OPHTHALMOLOGY

## 2024-03-22 RX ORDER — DORZOLAMIDE HYDROCHLORIDE AND TIMOLOL MALEATE 20; 5 MG/ML; MG/ML
1 SOLUTION/ DROPS OPHTHALMIC 2 TIMES DAILY
Qty: 10 ML | Refills: 0 | Status: SHIPPED | OUTPATIENT
Start: 2024-03-22 | End: 2024-06-24

## 2024-03-22 RX ORDER — DORZOLAMIDE HYDROCHLORIDE AND TIMOLOL MALEATE 20; 5 MG/ML; MG/ML
1 SOLUTION/ DROPS OPHTHALMIC 2 TIMES DAILY
Qty: 10 ML | Refills: 1 | Status: SHIPPED | OUTPATIENT
Start: 2024-03-22 | End: 2024-06-24

## 2024-03-22 ASSESSMENT — TONOMETRY
IOP_METHOD: ICARE
OD_IOP_MMHG: 23
OS_IOP_MMHG: 12

## 2024-03-22 ASSESSMENT — VISUAL ACUITY
OS_SC: 20/20
OD_SC: HM
METHOD: SNELLEN - LINEAR
OS_SC+: -1

## 2024-03-22 ASSESSMENT — SLIT LAMP EXAM - LIDS: COMMENTS: NORMAL

## 2024-03-22 ASSESSMENT — EXTERNAL EXAM - RIGHT EYE: OD_EXAM: NORMAL

## 2024-03-22 NOTE — NURSING NOTE
Chief Complaints and History of Present Illnesses   Patient presents with    Post Op (Ophthalmology) Right Eye     Chief Complaint(s) and History of Present Illness(es)       Post Op (Ophthalmology) Right Eye              Laterality: right eye    Associated symptoms: eye pain (mild)    Treatments tried: eye drops              Comments    Simon Bowers is a(n) 58 year old male who presents for 1-day post op right eye. Compliant with drops. Mild eye pain. Some difficulty sleeping.     Pranav Aparicio COT 7:42 AM March 22, 2024

## 2024-03-22 NOTE — PROGRESS NOTES
CC -   Post Op OD    INTERVAL HISTORY - POD #1 OD s/p PPV/SBP/SF6 for mac-on ST RD OD 3/21/24    PMH -   Simon Bowers is a  58 year old year-old patient with history of mac-on ST RD OD Dx 3/21/24    Fell while skiing ~ 3/4/24, noticed floaters & then VFD few days later worsening.  Seen at [facility] 3/20/24        PAST OCULAR SURGERY  PPV/SBP/SF6 OD 3/21/24    RETINAL IMAGING:  None      ASSESSMENT & PLAN    # POD #1 OD   - s/p PPV/SBP/SF6 OD 3/21/24   - IOP mild elevation, retina flat, no infection     - PF/oflox 4/day   - upright x 3 days then sleep LHS down   - f/u 1 week      # NS OU      # syneresis vs RD OS      Return in about 1 week (around 3/29/2024) for DFE OU, OCT OU.     ATTESTATION     Attending Attestation:     Complete documentation of historical and exam elements from today's encounter can be found in the full encounter summary report (not reduplicated in this progress note).  I personally obtained the chief complaint(s) and history of present illness.  I confirmed and edited as necessary the review of systems, past medical/surgical history, family history, social history, and examination findings as documented by others; and I examined the patient myself.  I personally reviewed the relevant tests, images, and reports as documented above.  I formulated and edited as necessary the assessment and plan and discussed the findings and management plan with the patient and family    Carissa Hernandez MD, PhD  , Vitreoretinal Surgery  Department of Ophthalmology  [facility]

## 2024-03-22 NOTE — PATIENT INSTRUCTIONS
POST-OPERATIVE INSTRUCTIONS FOLLOWING RETINA SURGERY    Carissa Hernandez MD, PhD  Department of Ophthalmology  [facility]  [phone]      ACTIVITY:  No heavy lifting for 2 weeks after surgery.  No swimming for 3 weeks after surgery.  It is OK for you to shower, to wash your face, and to wash your hair.  Allow the shower to hit the top of your head and wash down your face.  Do not hit your eye directly with the jet from the shower.  Keep your eye covered with the shield when you sleep for 1 week after surgery.  If your shield has a tab, it is designed to go over the bridge of your nose.  Place one piece of tape diagonally from the center of your forehead to the side of your cheek to secure the shield.   During the daytime, you can use either the shield or your regular eyeglasses or sunglasses to protect your eye.    GAS BUBBLE POSITIONING REQUIREMENTS  Positioning requirements will be discussed with you if you have an oil or gas bubble in your eye:    Position:    Upright day and night until Monday morning, then Upright daytime, sleep on left hand side    Maintain this positioning for 10 days.    When positioning, it is OK to take brief breaks to eat, stretch, clean up, etc.  Try to position for 90% of the time (5 minute break per hour on average).  Do not lay flat on your back until the bubble is gone.  Do not fly on an airplane or travel to elevations more than 1000 feet above [place] until the bubble is gone.  Keep the green bracelet on your wrist until the bubble is gone.  If it breaks, we can give you a replacement      EYE DROPS  Use these drops after surgery.  When using more than one drop, separate them by 3 minutes between drops.  Common times to place drops are breakfast, lunch, dinner, and bedtime.  Do not stop your drops without discussing with our office.  If you run out before your appointment, call and we will send in a refill.      Ofloxacin --- 4 times per day  Pred Forte  (prednisolone acetate) --- 4 times per day  Cosopt (Dorzolamide/timolol) --- 2 times per day    WHAT TO EXPECT  It is common for the eye to to have a blood tinged discharge for a few days after surgery  It may feel irritated (as if something were in your eye), for there to be clear discharge (thicker in the mornings upon awakening), and for it to be bloodshot for 2-3 weeks following retina surgery.   Your vision is also commonly decreased during this time due to the bubble.          WHAT TO WATCH OUT FOR  If you experience any of the following, you should call immediately:  Increasing pain  Increasing nausea or vomiting  Increasing redness  Worsening or darkening of the vision  New flashing lights or floaters      For any of the symptoms listed above, or for other concerns, call [phone] and ask to speak to the clinic nurse.  If you call after hours, follow to prompts to reach the doctor on call.

## 2024-03-25 ENCOUNTER — TELEPHONE (OUTPATIENT)
Dept: OPHTHALMOLOGY | Facility: CLINIC | Age: 59
End: 2024-03-25
Payer: COMMERCIAL

## 2024-03-25 NOTE — TELEPHONE ENCOUNTER
Patient called noting a little discharge with a tinge of blood in his Right POP eye.  He has no eye pain, vision change or fever.  Review with retina department and the patient should call if symptoms change such as eye pain, or vision change.

## 2024-03-29 ENCOUNTER — OFFICE VISIT (OUTPATIENT)
Dept: OPHTHALMOLOGY | Facility: CLINIC | Age: 59
End: 2024-03-29
Attending: OPHTHALMOLOGY
Payer: COMMERCIAL

## 2024-03-29 DIAGNOSIS — Z98.890 POST-OPERATIVE STATE: Primary | ICD-10-CM

## 2024-03-29 PROCEDURE — 99024 POSTOP FOLLOW-UP VISIT: CPT | Performed by: OPHTHALMOLOGY

## 2024-03-29 PROCEDURE — G0463 HOSPITAL OUTPT CLINIC VISIT: HCPCS | Performed by: OPHTHALMOLOGY

## 2024-03-29 ASSESSMENT — TONOMETRY
OS_IOP_MMHG: 14
IOP_METHOD: TONOPEN
OD_IOP_MMHG: 13

## 2024-03-29 ASSESSMENT — SLIT LAMP EXAM - LIDS
COMMENTS: NORMAL
COMMENTS: MILD ECCHYMOSIS

## 2024-03-29 ASSESSMENT — EXTERNAL EXAM - RIGHT EYE: OD_EXAM: NORMAL

## 2024-03-29 ASSESSMENT — CUP TO DISC RATIO
OS_RATIO: 0.3
OD_RATIO: SMALL

## 2024-03-29 ASSESSMENT — VISUAL ACUITY
METHOD: SNELLEN - LINEAR
OD_SC: 20/400

## 2024-03-29 NOTE — PATIENT INSTRUCTIONS
POST-OPERATIVE INSTRUCTIONS FOLLOWING RETINA SURGERY AFTER THE FIRST WEEK    Carissa Hernandez MD, PhD  Department of Ophthalmology  [facility]  [phone]      ACTIVITY:  No heavy lifting for 2 weeks after surgery.  No swimming for 3 weeks after surgery.  It is OK for you to shower, to wash your face, and to wash your hair.  Allow the shower to hit the top of your head and wash down your face.  Do not hit your eye directly with the jet from the shower.  You can stop using the shield at night      GAS BUBBLE POSITIONING REQUIREMENTS  Positioning requirements will be discussed with you if you have an oil or gas bubble in your eye.    After the fist week it is still important to avoid sleeping flat on your back until the bubble is gone.  When sleeping, you can sleep on your side and during the daytime you can be upright.    Upright daytime, sleep on left hand side    Do not lay flat on your back until the bubble is gone.  Do not fly on an airplane or travel to elevations more than 1000 feet above [place] until the bubble is gone.  Keep the green bracelet on your wrist until the bubble is gone.  If it breaks, we can give you a replacement      EYE DROPS  From now on use the drops as instructed below.  When using more than one drop, separate them by 3 minutes between drops.   Do not stop your drops without discussing with our office.  If you run out before your appointment, call and we will send in a refill.        Pred Forte (prednisolone acetate) --- use 3 times per day for 1 week, 2 times per day for 1 week, 1 time per day for 1 week, then stop  Cosopt (Dorzolamide/timolol) --- 2 times per day for 2 weeks then stop    WHAT TO EXPECT  It is common for the eye to to have a blood tinged discharge for a few days after surgery  It may feel irritated (as if something were in your eye), for there to be clear discharge (thicker in the mornings upon awakening), and for it to be bloodshot for 2-3 weeks  following retina surgery.   Your vision is also commonly decreased during this time due to the bubble.          WHAT TO WATCH OUT FOR  If you experience any of the following, you should call immediately:  Increasing pain  Increasing nausea or vomiting  Increasing redness  Worsening or darkening of the vision  New flashing lights or floaters      For any of the symptoms listed above, or for other concerns, call [phone] and ask to speak to the clinic nurse.  If you call after hours, follow to prompts to reach the doctor on call.

## 2024-03-29 NOTE — PROGRESS NOTES
CC -   Post Op OD    INTERVAL HISTORY - POW #1 OD s/p PPV/SBP/SF6 for mac-on ST RD OD 3/21/24  OK positioning,       PMH -   Simon Bowers is a  58 year old year-old patient with history of mac-on ST RD OD Dx 3/21/24    Fell while skiing ~ 3/4/24, noticed floaters & then VFD few days later worsening.  Seen at [facility] 3/20/24        PAST OCULAR SURGERY  PPV/SBP/SF6 OD 3/21/24    RETINAL IMAGING:  None      ASSESSMENT & PLAN    # POW #1 OD   - s/p PPV/SBP/SF6 OD 3/21/24   - IOP mild elevation, retina flat, no infection     - stop oflox   - taper PF 3/2/1/0   - continue cosopt x 2 weeks then stop        # NS OU   - feathering OD      # syneresis vs PVD OS   - verify with OCT in future   - advised S/Sx RD 3/29/2024      # meridional fold OS    Return in about 3 weeks (around 4/19/2024) for DFE OD, OCT OU.     ATTESTATION     Attending Attestation:     Complete documentation of historical and exam elements from today's encounter can be found in the full encounter summary report (not reduplicated in this progress note).  I personally obtained the chief complaint(s) and history of present illness.  I confirmed and edited as necessary the review of systems, past medical/surgical history, family history, social history, and examination findings as documented by others; and I examined the patient myself.  I personally reviewed the relevant tests, images, and reports as documented above.  I formulated and edited as necessary the assessment and plan and discussed the findings and management plan with the patient and family    Carissa Hernandez MD, PhD  , Vitreoretinal Surgery  Department of Ophthalmology  [facility]

## 2024-03-29 NOTE — NURSING NOTE
Chief Complaints and History of Present Illnesses   Patient presents with    Post Op (Ophthalmology) Right Eye         s/p PPV/SBP/SF6 for mac-on ST RD OD 3/21/24       Chief Complaint(s) and History of Present Illness(es)       Post Op (Ophthalmology) Right Eye              Comments:     s/p PPV/SBP/SF6 for mac-on ST RD OD 3/21/24                Comments    Pt states the right eye is getting better  Pt states no eye pain     Dasia Carmen COT 7:14 AM March 29, 2024

## 2024-04-04 DIAGNOSIS — Z98.890 POST-OPERATIVE STATE: Primary | ICD-10-CM

## 2024-04-19 ENCOUNTER — OFFICE VISIT (OUTPATIENT)
Dept: OPHTHALMOLOGY | Facility: CLINIC | Age: 59
End: 2024-04-19
Attending: OPHTHALMOLOGY
Payer: COMMERCIAL

## 2024-04-19 DIAGNOSIS — H25.041 POSTERIOR SUBCAPSULAR POLAR SENILE CATARACT OF RIGHT EYE: Primary | ICD-10-CM

## 2024-04-19 DIAGNOSIS — Z98.890 POST-OPERATIVE STATE: ICD-10-CM

## 2024-04-19 PROCEDURE — G0463 HOSPITAL OUTPT CLINIC VISIT: HCPCS | Performed by: OPHTHALMOLOGY

## 2024-04-19 PROCEDURE — 99207 OCT RETINA SPECTRALIS OU (BOTH EYE): CPT | Mod: 26 | Performed by: OPHTHALMOLOGY

## 2024-04-19 PROCEDURE — 99024 POSTOP FOLLOW-UP VISIT: CPT | Mod: GC | Performed by: OPHTHALMOLOGY

## 2024-04-19 PROCEDURE — 92134 CPTRZ OPH DX IMG PST SGM RTA: CPT | Performed by: OPHTHALMOLOGY

## 2024-04-19 ASSESSMENT — TONOMETRY
IOP_METHOD: TONOPEN
OD_IOP_MMHG: 17
OS_IOP_MMHG: 16

## 2024-04-19 ASSESSMENT — VISUAL ACUITY
OS_SC: 20/20
OD_PH_SC+: -2
OD_PH_SC: 20/70
METHOD: SNELLEN - LINEAR
OS_SC+: -1
OD_SC: 20/400

## 2024-04-19 ASSESSMENT — SLIT LAMP EXAM - LIDS
COMMENTS: NORMAL
COMMENTS: NORMAL

## 2024-04-19 ASSESSMENT — CUP TO DISC RATIO: OD_RATIO: SMALL

## 2024-04-19 ASSESSMENT — EXTERNAL EXAM - RIGHT EYE: OD_EXAM: NORMAL

## 2024-04-19 NOTE — NURSING NOTE
Chief Complaints and History of Present Illnesses   Patient presents with    Post Op (Ophthalmology) Right Eye     Chief Complaint(s) and History of Present Illness(es)       Post Op (Ophthalmology) Right Eye              Laterality: right eye    Quality: blurred vision    Course: stable    Associated symptoms: eye pain and floaters.  Negative for flashes    Treatments tried: eye drops              Comments    Here for post op  s/p PPV/SBP/SF6 right eye 3/21/2024. VA is blurry. Still seeing some black spots. No flashes. No pain. Compliant with drops.    Pranav Aparicio COT 8:41 AM April 19, 2024

## 2024-04-19 NOTE — PROGRESS NOTES
CC -   Post Op OD    INTERVAL HISTORY - POW #4 OD s/p PPV/SBP/SF6 for mac-on ST RD OD 3/21/24  stopped cosopt 1 week ago, last dose of PF today  No diplopia, mild horizontal distortion      PMH -   Simon Bowers is a  58 year old year-old patient with history of mac-on ST RD OD Dx 3/21/24    Fell while skiing ~ 3/4/24, noticed floaters & then VFD few days later worsening.  Seen at [facility] 3/20/24        PAST OCULAR SURGERY  PPV/SBP/SF6 OD 3/21/24    RETINAL IMAGING:  OCT 04/19/24  OD-small superior macula fold; preserved contour with no IRF/SRF; [employer]  OS-Normal contour with no fluid; PHF attached      ASSESSMENT & PLAN    # POM #1 OD   - s/p PPV/SBP/SF6 OD 3/21/24   - IOP WNL, retina flat, no infection   -Doing well      # NS OD > OS   - PSC OD, refer for eval, likely VS      # syneresis vs PVD OS   - verify with OCT in future   - advised S/Sx RD 04/19/24      # meridional fold OS    Return in about 8 weeks (around 6/14/2024) for DFE OD only, OCT macula.     Cory Ty MD MPH  Vitreoretinal Fellow PGY-6  [facility]     ATTESTATION     Attending Attestation:     Complete documentation of historical and exam elements from today's encounter can be found in the full encounter summary report (not reduplicated in this progress note).  I personally obtained the chief complaint(s) and history of present illness.  I confirmed and edited as necessary the review of systems, past medical/surgical history, family history, social history, and examination findings as documented by others; and I examined the patient myself.  I personally reviewed the relevant tests, images, and reports as documented above.  I personally reviewed the ophthalmic test(s) associated with this encounter, agree with the interpretation(s) as documented by the resident/fellow, and have edited the corresponding report(s) as necessary.   I formulated and edited as necessary the assessment and plan and discussed the findings and management  plan with the patient and family    Carissa Hernandez MD, PhD  , Vitreoretinal Surgery  Department of Ophthalmology  [facility]

## 2024-04-22 ENCOUNTER — TELEPHONE (OUTPATIENT)
Dept: OPHTHALMOLOGY | Facility: CLINIC | Age: 59
End: 2024-04-22

## 2024-04-22 NOTE — TELEPHONE ENCOUNTER
[facility] Call Center    Phone Message    May a detailed message be left on voicemail: yes     Reason for Call: Other: Patient states Dr. REYNAGA asked him if he wanted a referral for [facility] and he didn't think he need one.  But, as it turns out, patient states he does need a referral for [facility] to cover an eye exam due to the injury to his eye.  Patient did not have any info as to where the referral would need to be sent to [facility].  Please call.  Thank you.     Action Taken: Message routed to:  [facility] ([facility] Ophthalmology    Travel Screening: Not Applicable

## 2024-05-28 DIAGNOSIS — H25.041 POSTERIOR SUBCAPSULAR POLAR SENILE CATARACT OF RIGHT EYE: Primary | ICD-10-CM

## 2024-06-14 ENCOUNTER — OFFICE VISIT (OUTPATIENT)
Dept: OPHTHALMOLOGY | Facility: CLINIC | Age: 59
End: 2024-06-14
Attending: STUDENT IN AN ORGANIZED HEALTH CARE EDUCATION/TRAINING PROGRAM
Payer: COMMERCIAL

## 2024-06-14 DIAGNOSIS — H25.041 POSTERIOR SUBCAPSULAR POLAR SENILE CATARACT OF RIGHT EYE: Primary | ICD-10-CM

## 2024-06-14 DIAGNOSIS — H25.041 POSTERIOR SUBCAPSULAR POLAR SENILE CATARACT OF RIGHT EYE: ICD-10-CM

## 2024-06-14 DIAGNOSIS — H43.392 VITREOUS SYNERESIS OF LEFT EYE: ICD-10-CM

## 2024-06-14 PROCEDURE — 92134 CPTRZ OPH DX IMG PST SGM RTA: CPT | Performed by: OPHTHALMOLOGY

## 2024-06-14 PROCEDURE — 99214 OFFICE O/P EST MOD 30 MIN: CPT | Mod: 24

## 2024-06-14 PROCEDURE — 99024 POSTOP FOLLOW-UP VISIT: CPT | Mod: GC | Performed by: OPHTHALMOLOGY

## 2024-06-14 PROCEDURE — 999N000103 HC STATISTIC NO CHARGE FACILITY FEE

## 2024-06-14 PROCEDURE — G0463 HOSPITAL OUTPT CLINIC VISIT: HCPCS | Performed by: OPHTHALMOLOGY

## 2024-06-14 ASSESSMENT — VISUAL ACUITY
OD_SC: 20/500
METHOD: SNELLEN - LINEAR
METHOD: SNELLEN - LINEAR
OD_BAT_HIGH: <20/400
OS_SC: 20/20
OS_BAT_HIGH: 20/20-2
OD_SC: 20/500
METHOD_MR: DX PURPOSES
OS_SC: 20/20

## 2024-06-14 ASSESSMENT — CUP TO DISC RATIO
OS_RATIO: 0.3
OD_RATIO: SMALL

## 2024-06-14 ASSESSMENT — SLIT LAMP EXAM - LIDS
COMMENTS: NORMAL
COMMENTS: NORMAL

## 2024-06-14 ASSESSMENT — REFRACTION_MANIFEST
OD_SPHERE: -6.00
OD_ADD: +2.50
OS_CYLINDER: SPHERE
OS_ADD: +2.50
OS_SPHERE: +0.25
OD_CYLINDER: +2.50
OD_AXIS: 130

## 2024-06-14 ASSESSMENT — TONOMETRY
IOP_METHOD: TONOPEN
OD_IOP_MMHG: 8
IOP_METHOD: TONOPEN
OS_IOP_MMHG: 10
OD_IOP_MMHG: 8
OS_IOP_MMHG: 10

## 2024-06-14 ASSESSMENT — EXTERNAL EXAM - RIGHT EYE: OD_EXAM: NORMAL

## 2024-06-14 NOTE — PROGRESS NOTES
CC -   Post Op OD    INTERVAL HISTORY - POM #3 OD s/p PPV/SBP/SF6 for mac-on ST RD OD 3/21/24  Here for post op right eye. VA is about the same. Stable floaters without flashes. Some irritation left eye. No pain.      [facility] -   Simon Bowers is a  58 year old year-old patient with history of mac-on ST RD OD Dx 3/21/24  Fell while skiing ~ 3/4/24, noticed floaters & then VFD few days later worsening.  Seen at [facility] 3/20/24      PAST OCULAR SURGERY  PPV/SBP/SF6 OD 3/21/24    RETINAL IMAGING:  OCT 06/14/24   OD-Trace ERM; preserved contour with no IRF/SRF; [employer]  OS-Normal contour with no fluid; PHF attached; VMA      ASSESSMENT & PLAN    # POM #3 OD   - s/p PPV/SBP/SF6 OD 3/21/24   - IOP WNL, retina flat, no infection   - Doing well      # NS OD > OS   - PSC OD, refer for eval, likely VS   - Dr. Jacques Steele will evaluate for cataracts OD today      # syneresis vs PVD OS   - verify with OCT in future   - advised S/Sx RD 06/14/24      # meridional fold OS    Return in about 6 months (around 12/14/2024) for DFE OU.     Cory Ty MD MPH  Vitreoretinal Fellow PGY-6  [facility]     ATTESTATION     Attending Attestation:     Complete documentation of historical and exam elements from today's encounter can be found in the full encounter summary report (not reduplicated in this progress note).  I personally obtained the chief complaint(s) and history of present illness.  I confirmed and edited as necessary the review of systems, past medical/surgical history, family history, social history, and examination findings as documented by others; and I examined the patient myself.  I personally reviewed the relevant tests, images, and reports as documented above.  I personally reviewed the ophthalmic test(s) associated with this encounter, agree with the interpretation(s) as documented by the resident/fellow, and have edited the corresponding report(s) as necessary.   I formulated and edited as necessary the  assessment and plan and discussed the findings and management plan with the patient and family    Carissa Hernandez MD, PhD  , Vitreoretinal Surgery  Department of Ophthalmology  [facility]

## 2024-06-14 NOTE — PROGRESS NOTES
CC: cataract evaluation     HPI: blurred vision RIGHt eye, not improved with glasses    PMHx:   NA      Imaging:    OCT: 06/14/2024  Right eye: attached, good lamination   Left eye: Good foveal contour, no IRF/SRF     Assessment/ Plan: 06/14/2024       #Senile Cataracts OU  Patient is having difficulty with daily activities due to visual impairment. Patient feels that they are no longer managing with current correction and would like to move forward with cataract surgery. Explained benefits alternatives and risks including but not limited to loss of vision, severe infection, retinal detachment, need for more surgery, visual disturbances etc...  Informed patient that reaching uncorrected vision aim (without glasses) after cataract surgery is not certain. Made patient aware they may need glasses, laser vision correction or in worst case scenario, IOL exchange.      Dilates well  no PXF  no Flomax  no guttae    -Aim: distance  - dominant eye  -Previous surgery status:PPV  -Corneal astigmatismNA      # Hx of mac-on ST RD OD 3/21/24               - s/p PPV/SBP/SF6 OD 3/21/24              - IOP WNL, retina flat, no infection              -Doing well        # NS OD > OS              - PSC OD, refer for eval, likely VS        # syneresis vs PVD OS              - verify with OCT in future              - advised S/Sx RD 04/19/24        # meridional fold OS      Gabriella Steele MD     Medical Retina  [facility]     Attending Physician Attestation:  Complete documentation of historical and exam elements from today's encounter can be found in the full encounter summary report (not reduplicated in this progress note).  I personally obtained the chief complaint(s) and history of present illness.  I confirmed and edited as necessary the review of systems, past medical/surgical history, family history, social history, and examination findings as documented by others; and I examined the patient myself.   I personally reviewed the relevant tests, images, and reports as documented above.  I formulated and edited as necessary the assessment and plan and discussed the findings and management plan with the patient and family. Gabriella Steele MD

## 2024-06-17 ENCOUNTER — TELEPHONE (OUTPATIENT)
Dept: OPHTHALMOLOGY | Facility: CLINIC | Age: 59
End: 2024-06-17
Payer: COMMERCIAL

## 2024-06-17 PROBLEM — H25.041 POSTERIOR SUBCAPSULAR POLAR SENILE CATARACT OF RIGHT EYE: Status: ACTIVE | Noted: 2024-06-14

## 2024-06-17 NOTE — TELEPHONE ENCOUNTER
Called patient to schedule surgery with Dr Jacques Steele    Spoke with: patient    Date of Surgery: 7/2 (Right)      Patient aware of approximate arrival time: Yes at mid morning     Location of surgery: [facility] (Right)      Pre-Op H&P: PAC  at 6/24     Informed patient that they need to call to schedule pre-op H&P with  within 30 days of surgery date: Not Applicable    Post-Op Appt Dates: 7/12, 8/1     Discussed with patient pre-op RN will call 2-3 days prior to surgery with arrival time and instructions:  Not Applicable    Discussed with patient PAC RN will provide arrival time and instructions for surgery at the time of the appointment: [[place] locations only]: Yes      Standard Surgery Packet Sent: Yes  06/17/24  via Mail - Standard      Surgical Soap Discussed with Patient: No    Additional Information Sent in Packet: Pre-op Appointment Itinerary and Post-op Appointment Itinerary      Informed patient that they will need an adult  to bring patient home from surgery: Yes  : Patient is aware of the need for an adult          Additional Comments:        All patients questions were answered and was instructed to review surgical packet and call back [phone] with any questions or concerns.       Myriam Masters on 6/17/2024 at 9:22 AM

## 2024-06-18 NOTE — TELEPHONE ENCOUNTER
FUTURE VISIT INFORMATION      SURGERY INFORMATION:  Date: 7/2/24  Location:  or  Surgeon:  Gabriella Saldaña MD   Anesthesia Type:  MAC with Local  Procedure: RIGHT EYE PHACOEMULSIFICATION, CATARACT, WITH INTRAOCULAR LENS IMPLANT   Consult: ov 6/14/24    RECORDS REQUESTED FROM:       [facility]

## 2024-06-24 ENCOUNTER — OFFICE VISIT (OUTPATIENT)
Dept: SURGERY | Facility: CLINIC | Age: 59
End: 2024-06-24
Payer: COMMERCIAL

## 2024-06-24 ENCOUNTER — PRE VISIT (OUTPATIENT)
Dept: SURGERY | Facility: CLINIC | Age: 59
End: 2024-06-24

## 2024-06-24 ENCOUNTER — ANESTHESIA EVENT (OUTPATIENT)
Dept: SURGERY | Facility: AMBULATORY SURGERY CENTER | Age: 59
End: 2024-06-24
Payer: COMMERCIAL

## 2024-06-24 VITALS
DIASTOLIC BLOOD PRESSURE: 82 MMHG | WEIGHT: 189 LBS | SYSTOLIC BLOOD PRESSURE: 135 MMHG | BODY MASS INDEX: 25.6 KG/M2 | OXYGEN SATURATION: 97 % | HEART RATE: 72 BPM | TEMPERATURE: 98 F | HEIGHT: 72 IN

## 2024-06-24 DIAGNOSIS — Z01.818 PRE-OP EVALUATION: Primary | ICD-10-CM

## 2024-06-24 DIAGNOSIS — H25.041 POSTERIOR SUBCAPSULAR POLAR SENILE CATARACT OF RIGHT EYE: ICD-10-CM

## 2024-06-24 PROCEDURE — 99202 OFFICE O/P NEW SF 15 MIN: CPT | Performed by: PHYSICIAN ASSISTANT

## 2024-06-24 ASSESSMENT — PAIN SCALES - GENERAL: PAINLEVEL: NO PAIN (0)

## 2024-06-24 ASSESSMENT — ENCOUNTER SYMPTOMS: SEIZURES: 0

## 2024-06-24 ASSESSMENT — LIFESTYLE VARIABLES: TOBACCO_USE: 0

## 2024-06-24 NOTE — H&P
Pre-Operative H & P     CC:  Preoperative exam to assess for increased cardiopulmonary risk while undergoing surgery and anesthesia.    Date of Encounter: 6/24/2024  Primary Care Physician:  Holger Mock     Reason for visit:   Encounter Diagnoses   Name Primary?    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    He has a history of visually significant cataracts. He was evaluated by Dr. Jacques Steele on 6/14/24 and is now scheduled for surgical intervention on the right eye as above.     History is obtained from the patient and chart review    Hx of abnormal bleeding or anti-platelet use: Denies      Past Medical History  Past Medical History:   Diagnosis Date    Bell's palsy 01/01/2009    History of chronic left facial numbness, probably secondary to Bell's     History of paresthesias evaluated at [place] and reassured. 01/01/2009    Nonsenile cataract post surgery    after retinal surgery    Retinal detachment 02/05/2024    had surgery       Past Surgical History  Past Surgical History:   Procedure Laterality Date     KNEE SCOPE,MED/LAT MENISECTOMY Left 11/11/2009     UGI ENDOSCOPY DIAG W OR W/O BRUSH/WASH  05/25/2011    KNEE ARTHROSCOPY AND ARTHROTOMY Right 2018    RETINAL  REATTACHMENT  see above    VASECTOMY         Prior to Admission Medications  No current outpatient medications on file.       Allergies  Allergies   Allergen Reactions    No Known Drug Allergy     Seasonal Allergies      Not affecting him anymore       Social History  Social History     Socioeconomic History    Marital status:      Spouse name: Dasia    Number of children: 3    Years of education: 18    Highest education level: Not on file   Occupational History     Employer: [employer]   Tobacco Use    Smoking status: Never     Passive exposure: Never    Smokeless tobacco: Never    Tobacco comments:     No 2nd hand   Substance and Sexual Activity    Alcohol use: Yes     Comment: BEER/1-2 X PER MONTH or more    Drug use: No    Sexual activity: Yes     Partners: Female     Birth control/protection: Male Surgical   Other Topics Concern     Service No    Blood Transfusions No    Caffeine Concern No     Comment: none    Occupational Exposure Not Asked    Hobby Hazards Not Asked    Sleep Concern Not Asked    Stress Concern Not Asked    Weight Concern Not Asked    Special Diet Not Asked    Back Care Not Asked    Exercise Yes     Comment: runs    Bike Helmet Not Asked    Seat Belt Yes    Self-Exams Not Asked   Social History Narrative    Not on file     Social Determinants of Health     Financial Resource Strain: Low Risk  (1/22/2024)    Financial Resource Strain     Within the past 12 months, have you or your family members you live with been unable to get utilities (heat, electricity) when it was really needed?: No   Food Insecurity: Low Risk  (1/22/2024)    Food Insecurity     Within the past 12 months, did you worry that your food would run out before you got money to buy more?: No     Within the past 12 months, did the food you bought just not last and you didn t have money to get more?: No   Transportation Needs: Low Risk  (1/22/2024)    Transportation Needs     Within the past 12  months, has lack of transportation kept you from medical appointments, getting your medicines, non-medical meetings or appointments, work, or from getting things that you need?: No   Physical Activity: Not on file   Stress: Not on file   Social Connections: Not on file   Interpersonal Safety: Low Risk  (1/22/2024)    Interpersonal Safety     Do you feel physically and emotionally safe where you currently live?: Yes     Within the past 12 months, have you been hit, slapped, kicked or otherwise physically hurt by someone?: No     Within the past 12 months, have you been humiliated or emotionally abused in other ways by your partner or ex-partner?: No   Housing Stability: Low Risk  (1/22/2024)    Housing Stability     Do you have housing? : Yes     Are you worried about losing your housing?: No       Family History  Family History   Problem Relation Age of Onset    Hypertension Father     Cancer Father         Esophageal Cancer    Lipids Sister     C.A.D. Maternal Grandfather         Bypass    Diabetes Maternal Grandfather     Alzheimer Disease Maternal Grandfather         at late age    Neurologic Disorder Maternal Grandfather         Parkinsons at late age    Eye Disorder Other         MOTHERS SIDE- GLAUCOMA    Asthma No family hx of     Breast Cancer No family hx of     Cancer - colorectal No family hx of     Prostate Cancer No family hx of     Thyroid Disease No family hx of     Heart Disease No family hx of     Anesthesia Reaction No family hx of     Venous thrombosis No family hx of        Review of Systems  The complete review of systems is negative other than noted in the HPI or here.   Anesthesia Evaluation   Pt has had prior anesthetic. Type: General.    No history of anesthetic complications       ROS/MED HX  ENT/Pulmonary: Comment: Cataract  Hx of right retinal detachment   (-) tobacco use, asthma, MIGUEL risk factors and recent URI   Neurologic: Comment: Remote history of [place] Palsy    (-) no seizures, no  CVA and no TIA   Cardiovascular:  - neg cardiovascular ROS     METS/Exercise Tolerance: >4 METS Comment: Walking the dog 3 miles daily, biking, golfing   Hematologic:  - neg hematologic  ROS     Musculoskeletal:  - neg musculoskeletal ROS     GI/Hepatic:  - neg GI/hepatic ROS     Renal/Genitourinary:  - neg Renal ROS     Endo:  - neg endo ROS     Psychiatric/Substance Use:  - neg psychiatric ROS     Infectious Disease:  - neg infectious disease ROS     Malignancy:  - neg malignancy ROS     Other:  - neg other ROS          /82 (BP Location: Right arm, Patient Position: Sitting, Cuff Size: Adult Regular)   Pulse 72   Temp 98  F (36.7  C) (Oral)   Ht 1.829 m (6')   Wt 85.7 kg (189 lb)   SpO2 97%   BMI 25.63 kg/m      Physical Exam   Constitutional: Pleasant, well-appearing male, no apparent distress, and appears stated age.  Eyes: Pupils equal, round and reactive to light, extra ocular muscles intact, sclera clear, conjunctiva normal.  HENT: Normocephalic and atraumatic, oral pharynx with moist mucus membranes, good dentition. No goiter appreciated.   Respiratory: Clear to auscultation bilaterally, no crackles or wheezing.  Cardiovascular: Regular rate and rhythm, normal S1 and S2, and no murmur noted.  Carotids +2, no bruits. No edema. Palpable pulses to radial  DP and PT arteries.   GI: Normal bowel sounds, soft, non-distended, non-tender, no masses palpated, no hepatosplenomegaly.  Lymph/Hematologic: No cervical lymphadenopathy and no supraclavicular lymphadenopathy.  Genitourinary:  Deferred  Skin: Warm and dry.  No rashes on exposed skin   Musculoskeletal: Full ROM of neck. There is no redness, warmth, or swelling of the visible joints. Gross motor strength is normal.    Neurologic: Awake, alert, oriented to name, place and time. Cranial nerves II-XII are grossly intact. Gait is normal.   Neuropsychiatric: Calm, cooperative. Normal affect.       Prior Labs/Diagnostic Studies   All labs and imaging  personally reviewed     EKG/ stress test - if available please see in ROS above   No results found.    The patient's records and results personally reviewed by this provider.     Outside records reviewed from: Care Everywhere    LAB/DIAGNOSTIC STUDIES TODAY:  None    Assessment  Simon Bowers is a 58 year old male seen as a PAC referral for risk assessment and optimization for anesthesia.    Plan/Recommendations  Pt will be optimized for the proposed procedure.  See below for details on the assessment, risk, and preoperative recommendations    NEUROLOGY  - No history of TIA, CVA or seizure  - Remote history of [place] Palsy    -Post Op delirium risk factors:  No risk identified    HEENT  - No current airway concerns.  Will need to be reassessed day of surgery.  Mallampati: I  TM: > 3    - Cataract, right eye  Above surgery planned for further management  - History of retinal detachment  S/p surgical repair 3/21/24    CARDIAC  - No history of CAD, Hypertension, and Afib  - METS (Metabolic Equivalents)  Patient performs 4 or more METS exercise without symptoms             Total Score: 0      RCRI-Very low risk: Class 1 0.4% complication rate             Total Score: 0        PULMONARY    MIGUEL Low Risk             Total Score: 2    MIGUEL: Over 50 ys old    MIGUEL: Male      - Denies asthma or inhaler use  - Tobacco History    History   Smoking Status    Never   Smokeless Tobacco    Never       GI    PONV Low Risk  Total Score: 1           1 AN PONV: Patient is not a current smoker        /RENAL  - Baseline Creatinine  0.92    ENDOCRINE    - BMI: Estimated body mass index is 25.63 kg/m  as calculated from the following:    Height as of this encounter: 1.829 m (6').    Weight as of this encounter: 85.7 kg (189 lb).  Healthy Weight (BMI 18.5-24.9)  - No history of Diabetes Mellitus    HEME  VTE Low Risk 0.5%             Total Score: 3    VTE: Greater than 59 yrs old    VTE: Male      - No history of abnormal bleeding or  antiplatelet use.      Different anesthesia methods/types have been discussed with the patient, but they are aware that the final plan will be decided by the assigned anesthesia provider on the date of service.    The patient is optimized for their procedure. AVS with information on surgery time/arrival time, meds and NPO status given by nursing staff. No further diagnostic testing indicated.      On the day of service:     Prep time: 10 minutes  Visit time: 10 minutes  Documentation time: 5 minutes  ------------------------------------------  Total time: 25 minutes      Diane Lion PA-C  Preoperative Assessment Center  [facility]  Clinic and Surgery Center  Phone: [phone]  Fax: [phone]

## 2024-06-24 NOTE — PATIENT INSTRUCTIONS
Preparing for Your Surgery      Name:  Simon Bowers   MRN:  [id number]   :  1965   Today's Date:  2024         Arriving for surgery:  Surgery date:  2024  Arrival time:  9:30 AM    Restrictions due to COVID 19:    Please maintain social distance.  Masking is optional        parking is available for anyone with mobility limitations or disabilities. (Monday- Friday 7 am- 5 pm)    Please come to:    [facility]  [address] [place] [id number]    Check in on the 5th floor, Ambulatory Surgery Center.    What can I eat or drink?    -  You may eat and drink normally until 8 hours before arrival time  (Until 1:30 AM)  -  You may have clear liquids until 2 hours before arrival time  (Until 7:30 AM)    Examples of clear liquids:  Water  Clear broth  Juices (apple, white grape, white cranberry  and cider) without pulp  Noncarbonated, powder based beverages  (lemonade and Lamonte-Aid)  Sodas (Sprite, 7-Up, ginger ale and seltzer)  Coffee or tea (without milk or cream)  Gatorade    --No alcohol or cannabis products for at least 24 hours before surgery    Which medicines can I take?    Hold Aspirin for 7 days before surgery.   Hold Multivitamins for 7 days before surgery.  Hold Supplements for 7 days before surgery.  Hold Ibuprofen (Advil, Motrin) for 1 day before surgery--unless otherwise directed by surgeon.  Hold Naproxen (Aleve) for 4 days before surgery.      How do I prepare myself?  - Please take 2 showers (one the night prior to surgery and one the morning of surgery) using Scrubcare or Hibiclens soap.    Use this soap only from the neck to your toes.     Leave the soap on your skin for one minute--then rinse thoroughly.      You may use your own shampoo and conditioner; no other hair products.   - Please remove all jewelry and body piercings.  - No lotions, deodorants or fragrance.  - No makeup or fingernail polish.   - Bring your ID and insurance  card.    -If you have a Deep Brain Stimulator, a Spinal Cord Stimulator or any implanted Neuro device you must bring the remote to the Surgery Center          ALL PATIENTS ARE REQUIRED TO HAVE A RESPONSIBLE ADULT TO DRIVE AND BE IN ATTENDANCE WITH THEM FOR 24 HOURS FOLLOWING SURGERY       Covid testing policy as of 12/06/2022  Your surgeon will notify and schedule you for a COVID test if one is needed before surgery--please direct any questions or COVID symptoms to your surgeon      Questions or Concerns:    -For questions regarding the day of surgery please contact the Ambulatory Surgery Center at [phone].    -If you have health changes between today and your surgery please contact your surgeon.     For questions after surgery please call your surgeons office.

## 2024-07-01 ASSESSMENT — LIFESTYLE VARIABLES: TOBACCO_USE: 0

## 2024-07-01 ASSESSMENT — ENCOUNTER SYMPTOMS: SEIZURES: 0

## 2024-07-01 NOTE — ANESTHESIA PREPROCEDURE EVALUATION
Anesthesia Pre-Procedure Evaluation    Patient: Simon Bowers   MRN: [id number] : 1965        Procedure : Procedure(s):  RIGHT EYE PHACOEMULSIFICATION, CATARACT, WITH INTRAOCULAR LENS IMPLANT          Past Medical History:   Diagnosis Date     Bell's palsy 2009    History of chronic left facial numbness, probably secondary to Chau's      History of paresthesias evaluated at [place] and reassured. 2009     Nonsenile cataract post surgery    after retinal surgery     Retinal detachment 2024    had surgery      Past Surgical History:   Procedure Laterality Date     [facility] KNEE SCOPE,MED/LAT MENISECTOMY Left 2009     [facility] UGI ENDOSCOPY DIAG W OR W/O BRUSH/WASH  2011     KNEE ARTHROSCOPY AND ARTHROTOMY Right 2018     RETINAL REATTACHMENT  see above     VASECTOMY        Allergies   Allergen Reactions     No Known Drug Allergy      Seasonal Allergies      Not affecting him anymore      Social History     Tobacco Use     Smoking status: Never     Passive exposure: Never     Smokeless tobacco: Never     Tobacco comments:     No 2nd hand   Substance Use Topics     Alcohol use: Yes     Comment: BEER/1-2 X PER MONTH or more      Wt Readings from Last 1 Encounters:   24 85.7 kg (189 lb)        Anesthesia Evaluation   Pt has had prior anesthetic. Type: General.    No history of anesthetic complications       ROS/MED HX  ENT/Pulmonary: Comment: Cataract  Hx of right retinal detachment   (-) tobacco use, asthma, MIGUEL risk factors and recent URI   Neurologic: Comment: Remote history of [place] Palsy    (-) no seizures, no CVA and no TIA   Cardiovascular:  - neg cardiovascular ROS     METS/Exercise Tolerance: >4 METS Comment: Walking the dog 3 miles daily, biking, golfing   Hematologic:  - neg hematologic  ROS     Musculoskeletal:  - neg musculoskeletal ROS     GI/Hepatic:  - neg GI/hepatic ROS     Renal/Genitourinary:  - neg Renal ROS     Endo:  - neg endo ROS     Psychiatric/Substance Use:  -  "neg psychiatric ROS     Infectious Disease:  - neg infectious disease ROS     Malignancy:  - neg malignancy ROS     Other:  - neg other ROS          Physical Exam    Airway        Mallampati: I   TM distance: > 3 FB   Neck ROM: full   Mouth opening: > 3 cm    Respiratory Devices and Support         Dental       (+) Minor Abnormalities - some fillings, tiny chips      Cardiovascular   cardiovascular exam normal       Rhythm and rate: regular and normal     Pulmonary   pulmonary exam normal        breath sounds clear to auscultation       OUTSIDE LABS:  CBC:   Lab Results   Component Value Date    WBC 5.1 12/03/2004    HGB 14.8 11/06/2009    HGB 14.0 12/03/2004    HCT 41.7 12/03/2004     12/03/2004     BMP:   Lab Results   Component Value Date     08/11/2023     12/03/2004    POTASSIUM 4.2 08/11/2023    POTASSIUM 3.8 12/03/2004    CHLORIDE 103 08/11/2023    CHLORIDE 103 12/03/2004    CO2 24 08/11/2023    CO2 26 12/03/2004    BUN 12.3 08/11/2023    BUN 14 12/03/2004    CR 0.92 08/11/2023    CR 0.80 12/03/2004    GLC 97 08/11/2023     (H) 07/11/2012     COAGS: No results found for: \"PTT\", \"INR\", \"FIBR\"  POC: No results found for: \"BGM\", \"HCG\", \"HCGS\"  HEPATIC:   Lab Results   Component Value Date    ALBUMIN 4.3 08/11/2023    PROTTOTAL 6.2 (L) 08/11/2023    ALT 19 08/11/2023    AST 22 08/11/2023    ALKPHOS 69 08/11/2023    BILITOTAL 1.0 08/11/2023     OTHER:   Lab Results   Component Value Date    PH 6.5 03/06/2003    TEVIN 9.3 08/11/2023    TSH 0.69 07/11/2012       Anesthesia Plan    ASA Status:  1    NPO Status:  NPO Appropriate    Anesthesia Type: MAC.     - Reason for MAC: straight local not clinically adequate              Consents    Anesthesia Plan(s) and associated risks, benefits, and realistic alternatives discussed. Questions answered and patient/representative(s) expressed understanding.     - Discussed:     - Discussed with:  Patient      - Extended Intubation/Ventilatory Support " Discussed: No.      - Patient is DNR/DNI Status: No     Use of blood products discussed: No .     Postoperative Care    Pain management: Oral pain medications, IV analgesics.        Comments:               Taina Torres MD    I have reviewed the pertinent notes and labs in the chart from the past 30 days and (re)examined the patient.  Any updates or changes from those notes are reflected in this note.              # Overweight: Estimated body mass index is 25.63 kg/m  as calculated from the following:    Height as of 6/24/24: 1.829 m (6').    Weight as of 6/24/24: 85.7 kg (189 lb).

## 2024-07-02 ENCOUNTER — ANESTHESIA (OUTPATIENT)
Dept: SURGERY | Facility: AMBULATORY SURGERY CENTER | Age: 59
End: 2024-07-02
Payer: COMMERCIAL

## 2024-07-02 ENCOUNTER — HOSPITAL ENCOUNTER (OUTPATIENT)
Facility: AMBULATORY SURGERY CENTER | Age: 59
Discharge: HOME | End: 2024-07-02
Payer: COMMERCIAL

## 2024-07-02 VITALS
RESPIRATION RATE: 16 BRPM | SYSTOLIC BLOOD PRESSURE: 120 MMHG | HEART RATE: 66 BPM | TEMPERATURE: 97 F | WEIGHT: 189 LBS | BODY MASS INDEX: 25.05 KG/M2 | OXYGEN SATURATION: 97 % | HEIGHT: 73 IN | DIASTOLIC BLOOD PRESSURE: 75 MMHG

## 2024-07-02 DIAGNOSIS — H25.041 POSTERIOR SUBCAPSULAR POLAR SENILE CATARACT OF RIGHT EYE: Primary | ICD-10-CM

## 2024-07-02 PROCEDURE — 66984 XCAPSL CTRC RMVL W/O ECP: CPT | Mod: RT

## 2024-07-02 PROCEDURE — 66984 XCAPSL CTRC RMVL W/O ECP: CPT | Performed by: ANESTHESIOLOGY

## 2024-07-02 PROCEDURE — 66984 XCAPSL CTRC RMVL W/O ECP: CPT | Performed by: NURSE ANESTHETIST, CERTIFIED REGISTERED

## 2024-07-02 DEVICE — LENS CC60WF 15.5 CLAREON UV ASPHERIC BICONVEX IOL: Type: IMPLANTABLE DEVICE | Site: EYE | Status: FUNCTIONAL

## 2024-07-02 RX ORDER — LIDOCAINE HYDROCHLORIDE 10 MG/ML
INJECTION, SOLUTION EPIDURAL; INFILTRATION; INTRACAUDAL; PERINEURAL PRN
Status: DISCONTINUED | OUTPATIENT
Start: 2024-07-02 | End: 2024-07-02 | Stop reason: HOSPADM

## 2024-07-02 RX ORDER — FENTANYL CITRATE 50 UG/ML
INJECTION, SOLUTION INTRAMUSCULAR; INTRAVENOUS PRN
Status: DISCONTINUED | OUTPATIENT
Start: 2024-07-02 | End: 2024-07-02

## 2024-07-02 RX ORDER — TETRACAINE HYDROCHLORIDE 5 MG/ML
SOLUTION OPHTHALMIC PRN
Status: DISCONTINUED | OUTPATIENT
Start: 2024-07-02 | End: 2024-07-02 | Stop reason: HOSPADM

## 2024-07-02 RX ORDER — OXYCODONE HYDROCHLORIDE 5 MG/1
10 TABLET ORAL
Status: DISCONTINUED | OUTPATIENT
Start: 2024-07-02 | End: 2024-07-04 | Stop reason: HOSPADM

## 2024-07-02 RX ORDER — ONDANSETRON 4 MG/1
4 TABLET, ORALLY DISINTEGRATING ORAL EVERY 30 MIN PRN
Status: DISCONTINUED | OUTPATIENT
Start: 2024-07-02 | End: 2024-07-04 | Stop reason: HOSPADM

## 2024-07-02 RX ORDER — HYDROMORPHONE HYDROCHLORIDE 1 MG/ML
0.4 INJECTION, SOLUTION INTRAMUSCULAR; INTRAVENOUS; SUBCUTANEOUS EVERY 5 MIN PRN
Status: DISCONTINUED | OUTPATIENT
Start: 2024-07-02 | End: 2024-07-04 | Stop reason: HOSPADM

## 2024-07-02 RX ORDER — FENTANYL CITRATE 50 UG/ML
25 INJECTION, SOLUTION INTRAMUSCULAR; INTRAVENOUS EVERY 5 MIN PRN
Status: DISCONTINUED | OUTPATIENT
Start: 2024-07-02 | End: 2024-07-04 | Stop reason: HOSPADM

## 2024-07-02 RX ORDER — SODIUM CHLORIDE, SODIUM LACTATE, POTASSIUM CHLORIDE, CALCIUM CHLORIDE 600; 310; 30; 20 MG/100ML; MG/100ML; MG/100ML; MG/100ML
INJECTION, SOLUTION INTRAVENOUS CONTINUOUS
Status: DISCONTINUED | OUTPATIENT
Start: 2024-07-02 | End: 2024-07-04 | Stop reason: HOSPADM

## 2024-07-02 RX ORDER — MOXIFLOXACIN IN NACL,ISO-OS/PF 0.3MG/0.3
SYRINGE (ML) INTRAOCULAR PRN
Status: DISCONTINUED | OUTPATIENT
Start: 2024-07-02 | End: 2024-07-02 | Stop reason: HOSPADM

## 2024-07-02 RX ORDER — DEXAMETHASONE SODIUM PHOSPHATE 10 MG/ML
4 INJECTION, SOLUTION INTRAMUSCULAR; INTRAVENOUS
Status: DISCONTINUED | OUTPATIENT
Start: 2024-07-02 | End: 2024-07-04 | Stop reason: HOSPADM

## 2024-07-02 RX ORDER — ONDANSETRON 2 MG/ML
4 INJECTION INTRAMUSCULAR; INTRAVENOUS EVERY 30 MIN PRN
Status: DISCONTINUED | OUTPATIENT
Start: 2024-07-02 | End: 2024-07-04 | Stop reason: HOSPADM

## 2024-07-02 RX ORDER — CYCLOPENTOLAT/TROPIC/PHENYLEPH 1%-1%-2.5%
1 DROPS (EA) OPHTHALMIC (EYE)
Status: COMPLETED | OUTPATIENT
Start: 2024-07-02 | End: 2024-07-02

## 2024-07-02 RX ORDER — PROPARACAINE HYDROCHLORIDE 5 MG/ML
1 SOLUTION/ DROPS OPHTHALMIC ONCE
Status: COMPLETED | OUTPATIENT
Start: 2024-07-02 | End: 2024-07-02

## 2024-07-02 RX ORDER — OXYCODONE HYDROCHLORIDE 5 MG/1
5 TABLET ORAL
Status: DISCONTINUED | OUTPATIENT
Start: 2024-07-02 | End: 2024-07-04 | Stop reason: HOSPADM

## 2024-07-02 RX ORDER — LIDOCAINE 40 MG/G
CREAM TOPICAL
Status: DISCONTINUED | OUTPATIENT
Start: 2024-07-02 | End: 2024-07-04 | Stop reason: HOSPADM

## 2024-07-02 RX ORDER — NALOXONE HYDROCHLORIDE 0.4 MG/ML
0.1 INJECTION, SOLUTION INTRAMUSCULAR; INTRAVENOUS; SUBCUTANEOUS
Status: DISCONTINUED | OUTPATIENT
Start: 2024-07-02 | End: 2024-07-04 | Stop reason: HOSPADM

## 2024-07-02 RX ORDER — TRIAMCINOLONE ACETONIDE 40 MG/ML
INJECTION, SUSPENSION INTRA-ARTICULAR; INTRAMUSCULAR PRN
Status: DISCONTINUED | OUTPATIENT
Start: 2024-07-02 | End: 2024-07-02 | Stop reason: HOSPADM

## 2024-07-02 RX ORDER — BALANCED SALT SOLUTION 6.4; .75; .48; .3; 3.9; 1.7 MG/ML; MG/ML; MG/ML; MG/ML; MG/ML; MG/ML
SOLUTION OPHTHALMIC PRN
Status: DISCONTINUED | OUTPATIENT
Start: 2024-07-02 | End: 2024-07-02 | Stop reason: HOSPADM

## 2024-07-02 RX ORDER — FENTANYL CITRATE 50 UG/ML
50 INJECTION, SOLUTION INTRAMUSCULAR; INTRAVENOUS EVERY 5 MIN PRN
Status: DISCONTINUED | OUTPATIENT
Start: 2024-07-02 | End: 2024-07-04 | Stop reason: HOSPADM

## 2024-07-02 RX ORDER — HYDROMORPHONE HYDROCHLORIDE 1 MG/ML
0.2 INJECTION, SOLUTION INTRAMUSCULAR; INTRAVENOUS; SUBCUTANEOUS EVERY 5 MIN PRN
Status: DISCONTINUED | OUTPATIENT
Start: 2024-07-02 | End: 2024-07-04 | Stop reason: HOSPADM

## 2024-07-02 RX ADMIN — Medication 1 DROP: at 09:19

## 2024-07-02 RX ADMIN — Medication 1 DROP: at 09:24

## 2024-07-02 RX ADMIN — FENTANYL CITRATE 25 MCG: 50 INJECTION, SOLUTION INTRAMUSCULAR; INTRAVENOUS at 10:21

## 2024-07-02 RX ADMIN — SODIUM CHLORIDE, SODIUM LACTATE, POTASSIUM CHLORIDE, CALCIUM CHLORIDE: 600; 310; 30; 20 INJECTION, SOLUTION INTRAVENOUS at 09:29

## 2024-07-02 RX ADMIN — Medication 1 DROP: at 09:29

## 2024-07-02 RX ADMIN — FENTANYL CITRATE 25 MCG: 50 INJECTION, SOLUTION INTRAMUSCULAR; INTRAVENOUS at 10:15

## 2024-07-02 RX ADMIN — PROPARACAINE HYDROCHLORIDE 1 DROP: 5 SOLUTION/ DROPS OPHTHALMIC at 09:18

## 2024-07-02 NOTE — OP NOTE
Operative  Report    Patient Name: Simon Bowers    MRN: [id number]    Date of Surgery: 7/2/2024    Surgeon: Gabriella Steele MD    Assistant surgeon: none    Preoperative Diagnosis: Visually significant cataract, right eye    Postoperative Diagnosis: Same    Procedure: Phacoemulsification with intraocular lens implant, right eye    Implant: AIM distance  Implant Name Type Inv. Item Serial No.  Lot No. LRB No. Used Action   LENS [id number] 15.5 [employer] UV ASPHERIC BICONVEX IOL - [id number] Lens/Eye Implant LENS [id number] 15.5 [employer] UV ASPHERIC BICONVEX IOL [id number] [facility]  Right 1 Implanted       Anesthesia Type: local/topical    Estimated Blood Loss: Trace    Complications: None    INDICATIONS FOR PROCEDURE: Patient has a history of visually significant cataract affecting the patient's activity of daily living. After a discussion with the patient, the patient has elected to have the listed procedure(s) to maximize visual potential. All of the appropriate consent forms have been signed and all of the patient's questions have been answered.    DETAILS OF THE PROCEDURE: Patient was identified in the pre operative area and given pre operative eye drops. The patient was then brought into the operating room and intravenous sedation was begun after a time out was performed. The patient was prepped and draped in the usual sterile ophthalmic fashion.     A lid speculum was placed and the operating microscope was brought into position. A paracentesis was made and viscoelastic was injected into the anterior chamber. A clear corneal incision was made using a keratome blade. A cystotome needle and Utrata forceps were used to create an anterior continuous curvilinear capsulorhexis. Then BSS on a blunt tipped cannula was used for hydrodissection and hydrodelineation. Using the phacoemulsification unit, the nucleus was then removed from the eye. Using irrigation  and aspiration, the remaining cortical material was removed from the eye. The capsular bag was infused with viscoelastic material. The intraocular lens was then placed into the capsular bag and secured into position. The remaining viscoelastic material was then removed from the eye via irrigation and aspiration. Miochol was injected to contrict the pupil. BSS on a blunt tipped cannula was used to hydrate all of the wounds. At the end of the case, the wounds were noted to be watertight, the pupil was noted to be round, the lens appeared to be in good position, and the pressure was palpated to be physiologic. Intracameral moxifloxacin and A subconjunctival injection of Kenalog was administered.     Post operative ointment was applied and the eye was patched and shielded. Patient tolerated procedure and was brought to the recover area in good condition. Patient will follow in the eye clinic in one day.

## 2024-07-02 NOTE — DISCHARGE INSTRUCTIONS
[facility]  Home Care Following Anesthesia  For 24 hours after surgery:  Get plenty of rest.  A responsible adult must stay with you for at least 24 hours after you leave the surgery center.  Do not drive or use heavy equipment.  If you have weakness or tingling, don't drive or use heavy equipment until this feeling goes away.   Do not drink alcohol.   Avoid strenuous or risky activities.  Ask for help when climbing stairs.  You may feel lightheaded.  IF so, sit for a few minutes before standing.  Have someone help you get up.   If you have nausea (feel sick to your stomach): Drink only clear liquids such as apple juice, ginger ale, broth or 7-Up.  Rest may also help.  Be sure to drink enough fluids.  Move to a regular diet as you feel able.   You may have a slight fever.  Call the doctor if your fever is over 100 F (37.7 C) (taken under the tongue) or lasts longer than 24 hours.  You may have a dry mouth, a sore throat, muscle aches or trouble sleeping. These should go away after 24 hours.  Do not make important or legal decisions.   It is recommended to avoid smoking.               Tips for taking pain medications  To get the best pain relief possible, remember these points:  Take pain medications as directed, before pain becomes severe.  Pain medication can upset your stomach: taking it with food may help.  Constipation is a common side effect of pain medication. Drink plenty of  fluids.  Eat foods high in fiber. Take a stool softener if recommended by your doctor or pharmacist.  Do not drink alcohol, drive or operate machinery while taking pain medications.  Ask about other ways to control pain, such as with heat, ice or relaxation.    Tylenol/Acetaminophen Consumption    If you feel your pain relief is insufficient, you may take Tylenol/Acetaminophen in addition to your narcotic pain medication.   Be careful not to exceed 4,000 mg of Tylenol/Acetaminophen in a 24 hour  period from all sources.  If you are taking extra strength Tylenol/acetaminophen (500 mg), the maximum dose is 8 tablets in 24 hours.  If you are taking regular strength acetaminophen (325 mg), the maximum dose is 12 tablets in 24 hours.    Call a doctor for any of the following:  Signs of infection (fever, growing tenderness at the surgery site, a large amount of drainage or bleeding, severe pain, foul-smelling drainage, redness, swelling).  It has been over 8 to 10 hours since surgery and you are still not able to urinate (pass water).  Headache for over 24 hours.  Numbness, tingling or weakness the day after surgery (if you had spinal anesthesia).  Signs of Covid-19 infection (temperature over 100 degrees, shortness of breath, cough, loss of taste/smell, generalized body aches, persistent headache, chills, sore throat, nausea/vomiting/diarrhea)  Your doctor is:  Dr. Gabriella Steele, Ophthalmology: [phone]                    Or dial [phone] and ask for the resident on call for:  Ophthalmology  For emergency care, call the:  [facility]:  [phone] (TTY for hearing impaired: [phone])                     Post-Operative Instructions     FIRST 24 HOURS AFTER SURGERY:  You are allowed to Tylenol (acetaminophen) 650mg every four hours as needed for pain unless you have liver disease or are allergic to Tylenol..  Continue taking your regular medications and eye drops in the non-operative eye.  Do not remove the metal or plastic shield unless otherwise instructed by your surgeon.  Do not operate a car, motorcycle, or machinery for 24 hours after surgery.  Call ED immediately if you have SEVERE PAIN unrelieved with Tylenol. And  ask for the resident on call.     MEDICATION INSTRUCTIONS:  -You will not have treatment eye drops prescription as medications were injected into your eye.  -If you feel your eyes are dry and itchy, you can use regular lubricating drops for one month after the  surgery. These eye drops can be found over the counter Brand names example: Systane, Refresh. Please choose preservative free drops. To be used four times a day and as needed for 1 month  -Instilling the eye drops directly into the eye.    -If you had previously any glaucoma eye drops, You can remove the cover and instill the drops as prescribed before and after the procedure      GENERAL INSTRUCTIONS:  Hygiene of the operated eye  Wash your hands thoroughly before caring for the eye.  If the lids are sticky or itchy in the morning, debris, or matter can be gently wiped away with a cotton ball moistened with tap water. DO NOT press on the lids or eyeball.     FIVE MINUTES APART. If ointment is prescribed, it should be applied last.  If you have been taking medications in the non-operated eye, continue as they were prescribed.  If you take medications by mouth for a medical problem, continue as they were prescribed (unless otherwise instructed by physician)    EYE PROTECTION  From the time of surgery until the time of your post-operative day 1 appointment, wear the eye shield at all times. Then, wear it whenever sleeping, for 1 week.  Protective sunglasses may be worn as needed for your comfort, and are suggested for outdoor activities.    ACTIVITIES  Avoid vigorous exertion and heavy lifting for the first week after surgery  You may take a bath or shower, but avoid getting water directly into your eye for one week after surgery, usually by keeping your eyes closed during the bath.  You should discuss driving and traveling with your surgeon. You may ride in a car and fly in an airplane unless otherwise instructed.  Avoid swimming or using a hot tube/sauna/pool/lake for 2 weeks after the surgery.      WHAT TO EXPECT:  Mild irritation and discomfort are normal.    Call the doctor if you experience any of the following:  Severe eye pain  Nausea  Vomiting  Severe headache

## 2024-07-02 NOTE — ANESTHESIA CARE TRANSFER NOTE
Patient: Simon Bowers    Procedure: Procedure(s):  RIGHT EYE PHACOEMULSIFICATION, CATARACT, WITH INTRAOCULAR LENS IMPLANT       Diagnosis: Posterior subcapsular polar senile cataract of right eye [H25.041]  Diagnosis Additional Information: No value filed.    Anesthesia Type:   MAC     Note:    Oropharynx: oropharynx clear of all foreign objects  Level of Consciousness: awake  Oxygen Supplementation: room air    Independent Airway: airway patency satisfactory and stable  Dentition: dentition unchanged  Vital Signs Stable: post-procedure vital signs reviewed and stable  Report to RN Given: handoff report given  Patient transferred to: Phase II  Comments: Report to Phase II RN. Resps easy and reg.   Handoff Report: Identifed the Patient, Identified the Reponsible Provider, Reviewed the pertinent medical history, Discussed the surgical course, Reviewed Intra-OP anesthesia mangement and issues during anesthesia, Set expectations for post-procedure period and Allowed opportunity for questions and acknowledgement of understanding      Vitals:  Vitals Value Taken Time   /75 07/02/24 1056   Temp 36.1  C (97  F) 07/02/24 1056   Pulse 66 07/02/24 1056   Resp 16 07/02/24 1056   SpO2 97 % 07/02/24 1056       Electronically Signed By: MARIA DEL CARMEN Argueta CRNA  July 2, 2024  11:05 AM

## 2024-07-03 NOTE — PROGRESS NOTES
Po day 0:  S/p phaco +IOL OD   Operation ent well.   Patient does not have pain  IOP: 13 by tonopen   Anterior segment exam:  - cornea clear, no leakage   - Ac 1+ cell  - PcIOL well centered   Follow-up in 1 week or po check.  No eye drops needed  Precautions given      Gabriella Steele MD      Ophthalmology Department  [facility]       Attending Physician Attestation:  Complete documentation of historical and exam elements from today's encounter can be found in the full encounter summary report (not reduplicated in this progress note).  I personally obtained the chief complaint(s) and history of present illness.  I confirmed and edited as necessary the review of systems, past medical/surgical history, family history, social history, and examination findings as documented by others; and I examined the patient myself.  I personally reviewed the relevant tests, images, and reports as documented above.  I formulated and edited as necessary the assessment and plan and discussed the findings and management plan with the patient and family. Gabriella Steele MD

## 2024-07-12 ENCOUNTER — OFFICE VISIT (OUTPATIENT)
Dept: OPHTHALMOLOGY | Facility: CLINIC | Age: 59
End: 2024-07-12
Payer: COMMERCIAL

## 2024-07-12 DIAGNOSIS — H25.041 POSTERIOR SUBCAPSULAR POLAR SENILE CATARACT OF RIGHT EYE: Primary | ICD-10-CM

## 2024-07-12 PROCEDURE — G0463 HOSPITAL OUTPT CLINIC VISIT: HCPCS

## 2024-07-12 PROCEDURE — 99024 POSTOP FOLLOW-UP VISIT: CPT

## 2024-07-12 ASSESSMENT — VISUAL ACUITY
METHOD: SNELLEN - LINEAR
OD_SC+: +2
OD_SC: 20/30
OD_PH_SC: 20/25
OS_SC: 20/20
OS_SC+: +2

## 2024-07-12 ASSESSMENT — TONOMETRY
OS_IOP_MMHG: 12
OD_IOP_MMHG: 12
IOP_METHOD: TONOPEN

## 2024-07-12 ASSESSMENT — CONF VISUAL FIELD
OS_SUPERIOR_NASAL_RESTRICTION: 0
OS_INFERIOR_TEMPORAL_RESTRICTION: 0
OD_SUPERIOR_NASAL_RESTRICTION: 0
METHOD: COUNTING FINGERS
OD_INFERIOR_NASAL_RESTRICTION: 0
OS_NORMAL: 1
OD_INFERIOR_TEMPORAL_RESTRICTION: 0
OS_INFERIOR_NASAL_RESTRICTION: 0
OD_SUPERIOR_TEMPORAL_RESTRICTION: 0
OS_SUPERIOR_TEMPORAL_RESTRICTION: 0
OD_NORMAL: 1

## 2024-07-12 NOTE — PROGRESS NOTES
1 week PO:  S/p phaco +IOL OD  Doing well  [facility] 20/25  IOP: 12  Anterior segment exam:  - cornea clear, no leakage   - Ac 1+ cell  - PcIOL well centered   Follow-up in 3 week or po check.  Start preservative free artificial tears OU TID  Precautions given    Gabriella Steele MD      Ophthalmology Department  [facility]     Attending Physician Attestation:  Complete documentation of historical and exam elements from today's encounter can be found in the full encounter summary report (not reduplicated in this progress note).  I personally obtained the chief complaint(s) and history of present illness.  I confirmed and edited as necessary the review of systems, past medical/surgical history, family history, social history, and examination findings as documented by others; and I examined the patient myself.  I personally reviewed the relevant tests, images, and reports as documented above.  I formulated and edited as necessary the assessment and plan and discussed the findings and management plan with the patient and family. I personally reviewed the ophthalmic test(s) associated with this encounter, agree with the interpretation(s) as documented by the resident/fellow, and have edited the corresponding report(s) as necessary. - Gabriella Steele MD

## 2024-07-12 NOTE — NURSING NOTE
Chief Complaints and History of Present Illnesses   Patient presents with    Post Op (Ophthalmology) Right Eye     1 week s/p CE/IOL Right eye     Chief Complaint(s) and History of Present Illness(es)       Post Op (Ophthalmology) Right Eye              Comments: 1 week s/p CE/IOL Right eye              Comments    Pt states vision is slightly blurry in RE, but is better thank before. No eye pain today. No new flashes or floaters. Still having some redness in both eyes but improving.     SOFIA Ngo July 12, 2024 7:57 AM

## 2024-08-01 ENCOUNTER — OFFICE VISIT (OUTPATIENT)
Dept: OPHTHALMOLOGY | Facility: CLINIC | Age: 59
End: 2024-08-01
Payer: COMMERCIAL

## 2024-08-01 DIAGNOSIS — H59.039 IRVINE-GASS SYNDROME: ICD-10-CM

## 2024-08-01 DIAGNOSIS — Z98.890 POSTOPERATIVE EYE STATE: Primary | ICD-10-CM

## 2024-08-01 PROCEDURE — G0463 HOSPITAL OUTPT CLINIC VISIT: HCPCS

## 2024-08-01 PROCEDURE — 99024 POSTOP FOLLOW-UP VISIT: CPT | Mod: GC

## 2024-08-01 RX ORDER — PREDNISOLONE ACETATE 10 MG/ML
1-2 SUSPENSION/ DROPS OPHTHALMIC 3 TIMES DAILY
Qty: 15 ML | Refills: 3 | Status: SHIPPED | OUTPATIENT
Start: 2024-08-01

## 2024-08-01 RX ORDER — KETOROLAC TROMETHAMINE 5 MG/ML
1 SOLUTION OPHTHALMIC 4 TIMES DAILY
Qty: 10 ML | Refills: 3 | Status: SHIPPED | OUTPATIENT
Start: 2024-08-01

## 2024-08-01 ASSESSMENT — VISUAL ACUITY
OD_SC+: +2
OD_SC: 20/60
METHOD: SNELLEN - LINEAR
OS_SC+: -2
METHOD_MR: DIAGNOSTIC ONLY
OD_PH_SC: 20/50
OS_SC: 20/25
OD_PH_SC+: -2

## 2024-08-01 ASSESSMENT — TONOMETRY
OS_IOP_MMHG: 13
OD_IOP_MMHG: 14
IOP_METHOD: TONOPEN

## 2024-08-01 ASSESSMENT — EXTERNAL EXAM - RIGHT EYE: OD_EXAM: NORMAL

## 2024-08-01 ASSESSMENT — REFRACTION_MANIFEST
OD_CYLINDER: +0.75
OD_ADD: +2.25
OD_AXIS: 112
OD_SPHERE: +0.25

## 2024-08-01 ASSESSMENT — SLIT LAMP EXAM - LIDS
COMMENTS: NORMAL
COMMENTS: NORMAL

## 2024-08-01 ASSESSMENT — CUP TO DISC RATIO: OD_RATIO: SMALL

## 2024-08-01 NOTE — NURSING NOTE
Chief Complaints and History of Present Illnesses   Patient presents with    Post Op (Ophthalmology) Right Eye     Chief Complaint(s) and History of Present Illness(es)       Post Op (Ophthalmology) Right Eye              Laterality: right eye    Associated symptoms: dryness.  Negative for eye pain, floaters, itching and burning    Pain scale: 0/10              Comments    Simon is here 4 weeks post right cataract surgery with IOL implant. He states right eye still blurry.    Simon Jos COT 7:36 AM August 1, 2024

## 2024-08-01 NOTE — PATIENT INSTRUCTIONS
Predforte 1 drop in RIGHT eye three times a day for 4 weeks  Ketorolac 1 drop in the RIGHt eye 4 times a day for 4 weeks

## 2024-08-01 NOTE — PROGRESS NOTES
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               - s/p PPV/SBP/SF6 OD 3/21/24              - IOP WNL, retina flat, no infection              -Doing well      # 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  I personally obtained the chief complaint(s) and history of present illness.  I confirmed and edited as necessary the review of systems, past medical/surgical history, family history, social history, and examination findings as documented by others; and I examined the patient myself.  I personally reviewed the relevant tests, images, and reports as documented above.  I formulated and edited as necessary the assessment and plan and discussed the findings and management plan with the patient and family. I personally reviewed the ophthalmic test(s) associated with this encounter, agree with the interpretation(s) as documented by the resident/fellow, and have edited the corresponding report(s) as necessary. Valentin Quiroz  Jacques Steele MD

## 2024-08-05 ENCOUNTER — TELEPHONE (OUTPATIENT)
Dept: OPHTHALMOLOGY | Facility: CLINIC | Age: 59
End: 2024-08-05
Payer: COMMERCIAL

## 2024-08-05 NOTE — TELEPHONE ENCOUNTER
Spoke with pt regarding follow-up appt with Dr. Jacques Steele. She had thought she wouldn't be here at the 4 week follow-up timeframe but then realized that she would be here and asked that I call and set up an appt with her.    Pt, however, will not be in town (gone for a work trip 8/23-30), so the appt on 8/29 will not work for him. Will need to call back to get an appt with a fellow the following week.    Jayne Joyce, COA 9:55 AM August 5, 2024

## 2024-08-15 ENCOUNTER — ALLIED HEALTH/NURSE VISIT (OUTPATIENT)
Dept: OPHTHALMOLOGY | Facility: CLINIC | Age: 59
End: 2024-08-15
Payer: COMMERCIAL

## 2024-08-15 DIAGNOSIS — Z98.890 POSTOPERATIVE EYE STATE: Primary | ICD-10-CM

## 2024-08-15 PROCEDURE — 99207 PR NO CHARGE COORDINATED CARE PS: CPT

## 2024-08-15 ASSESSMENT — TONOMETRY
IOP_METHOD: TONOPEN
OS_IOP_MMHG: 17
OD_IOP_MMHG: 14

## 2024-09-03 ENCOUNTER — MYC MEDICAL ADVICE (OUTPATIENT)
Dept: OPHTHALMOLOGY | Facility: CLINIC | Age: 59
End: 2024-09-03
Payer: COMMERCIAL

## 2024-09-03 NOTE — TELEPHONE ENCOUNTER
Spoke to pt and set up for follow-up appt with Dr. Tovar next Monday, 9/9, at 12:45 PM. Pt questions whether he should be continuing on the eye drops until the visit. I will route a message to provider and will call pt back with the answer.    Jayne Joyce, TERESSA 6:18 PM September 3, 2024

## 2024-09-09 ENCOUNTER — OFFICE VISIT (OUTPATIENT)
Dept: OPHTHALMOLOGY | Facility: CLINIC | Age: 59
End: 2024-09-09
Attending: STUDENT IN AN ORGANIZED HEALTH CARE EDUCATION/TRAINING PROGRAM
Payer: COMMERCIAL

## 2024-09-09 DIAGNOSIS — Z98.890 POSTOPERATIVE EYE STATE: Primary | ICD-10-CM

## 2024-09-09 PROCEDURE — 92015 DETERMINE REFRACTIVE STATE: CPT

## 2024-09-09 PROCEDURE — 99024 POSTOP FOLLOW-UP VISIT: CPT | Performed by: STUDENT IN AN ORGANIZED HEALTH CARE EDUCATION/TRAINING PROGRAM

## 2024-09-09 PROCEDURE — G0463 HOSPITAL OUTPT CLINIC VISIT: HCPCS | Performed by: STUDENT IN AN ORGANIZED HEALTH CARE EDUCATION/TRAINING PROGRAM

## 2024-09-09 ASSESSMENT — VISUAL ACUITY
OS_SC: 20/20
OD_SC+: -2
OD_SC: 20/25
METHOD: SNELLEN - LINEAR

## 2024-09-09 ASSESSMENT — TONOMETRY
OS_IOP_MMHG: 15
OD_IOP_MMHG: 14
IOP_METHOD: TONOPEN

## 2024-09-09 ASSESSMENT — SLIT LAMP EXAM - LIDS
COMMENTS: NORMAL
COMMENTS: NORMAL

## 2024-09-09 ASSESSMENT — EXTERNAL EXAM - RIGHT EYE: OD_EXAM: NORMAL

## 2024-09-09 ASSESSMENT — REFRACTION_MANIFEST
OD_ADD: +2.50
OS_ADD: +2.50
OD_CYLINDER: +1.25
OS_CYLINDER: SPHERE
OD_AXIS: 118
OS_SPHERE: PLANO
OD_SPHERE: -1.00

## 2024-09-09 ASSESSMENT — CUP TO DISC RATIO: OD_RATIO: SMALL

## 2024-09-09 NOTE — NURSING NOTE
Chief Complaints and History of Present Illnesses   Patient presents with    Post Op (Ophthalmology) Right Eye     Post Cataract extraction with IOL in the right eye on 07/02/2024         Chief Complaint(s) and History of Present Illness(es)       Post Op (Ophthalmology) Right Eye              Laterality: right eye    Course: gradually improving    Associated symptoms: floaters (occasional, seems improved).  Negative for dryness, eye pain and tearing    Treatments tried: no treatments    Pain scale: 0/10    Comments: Post Cataract extraction with IOL in the right eye on 07/02/2024                  Comments    He states that his right eye vision seems improved.  He stopped using post operative eye drops on 09/02/2024.    Joyce Castillo, JASON 12:49 PM  September 9, 2024

## 2024-09-09 NOTE — PATIENT INSTRUCTIONS
If you experience any of the following, you should call immediately:  Increasing pain  Increasing nausea or vomiting  Increasing redness  Worsening or darkening of the vision  New flashing lights or floaters      For any of the symptoms listed above, or for other concerns, call [phone] and ask to speak to the clinic nurse.  If you call after hours, follow to prompts to reach the doctor on call.

## 2024-09-09 NOTE — PROGRESS NOTES
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  I personally obtained the chief complaint(s) and history of present illness.  I confirmed and edited as necessary the review of systems, past medical/surgical history, family history, social history, and examination findings as documented by others; and I examined the patient myself.  I personally reviewed the relevant tests, images, and reports as documented above.  I formulated and edited as necessary the assessment and plan and discussed the findings and management plan with the patient and family    Wilfrid Rosenberg MD  PGY-6 Vitreo-retina surgery Fellow  Department of Ophthalmology   [facility]

## 2024-11-03 ENCOUNTER — HEALTH MAINTENANCE LETTER (OUTPATIENT)
Age: 59
End: 2024-11-03

## 2024-11-05 ENCOUNTER — LAB REQUISITION (OUTPATIENT)
Dept: LAB | Facility: CLINIC | Age: 59
End: 2024-11-05

## 2024-11-05 ENCOUNTER — MEDICAL CORRESPONDENCE (OUTPATIENT)
Dept: HEALTH INFORMATION MANAGEMENT | Facility: CLINIC | Age: 59
End: 2024-11-05
Payer: COMMERCIAL

## 2024-11-05 DIAGNOSIS — Z13.220 ENCOUNTER FOR SCREENING FOR LIPOID DISORDERS: ICD-10-CM

## 2024-11-05 DIAGNOSIS — Z12.5 ENCOUNTER FOR SCREENING FOR MALIGNANT NEOPLASM OF PROSTATE: ICD-10-CM

## 2024-11-05 DIAGNOSIS — Z13.1 ENCOUNTER FOR SCREENING FOR DIABETES MELLITUS: ICD-10-CM

## 2024-11-05 LAB
ALBUMIN SERPL BCG-MCNC: 4.5 G/DL (ref 3.5–5.2)
ALP SERPL-CCNC: 76 U/L (ref 40–150)
ALT SERPL W P-5'-P-CCNC: 17 U/L (ref 0–70)
ANION GAP SERPL CALCULATED.3IONS-SCNC: 11 MMOL/L (ref 7–15)
AST SERPL W P-5'-P-CCNC: 21 U/L (ref 0–45)
BILIRUB SERPL-MCNC: 0.5 MG/DL
BUN SERPL-MCNC: 12.3 MG/DL (ref 8–23)
CALCIUM SERPL-MCNC: 9.5 MG/DL (ref 8.8–10.4)
CHLORIDE SERPL-SCNC: 104 MMOL/L (ref 98–107)
CHOLEST SERPL-MCNC: 200 MG/DL
CREAT SERPL-MCNC: 0.89 MG/DL (ref 0.67–1.17)
EGFRCR SERPLBLD CKD-EPI 2021: >90 ML/MIN/1.73M2
FASTING STATUS PATIENT QL REPORTED: ABNORMAL
FASTING STATUS PATIENT QL REPORTED: ABNORMAL
GLUCOSE SERPL-MCNC: 111 MG/DL (ref 70–99)
HCO3 SERPL-SCNC: 26 MMOL/L (ref 22–29)
HDLC SERPL-MCNC: 35 MG/DL
LDLC SERPL CALC-MCNC: 137 MG/DL
NONHDLC SERPL-MCNC: 165 MG/DL
POTASSIUM SERPL-SCNC: 4.4 MMOL/L (ref 3.4–5.3)
PROT SERPL-MCNC: 7.1 G/DL (ref 6.4–8.3)
PSA SERPL DL<=0.01 NG/ML-MCNC: 0.71 NG/ML (ref 0–4.5)
SODIUM SERPL-SCNC: 141 MMOL/L (ref 135–145)
TRIGL SERPL-MCNC: 138 MG/DL

## 2024-11-05 PROCEDURE — 82310 ASSAY OF CALCIUM: CPT | Performed by: FAMILY MEDICINE

## 2024-11-05 PROCEDURE — 82465 ASSAY BLD/SERUM CHOLESTEROL: CPT | Performed by: FAMILY MEDICINE

## 2024-11-05 PROCEDURE — G0103 PSA SCREENING: HCPCS | Performed by: FAMILY MEDICINE

## 2024-11-06 ENCOUNTER — TRANSFERRED RECORDS (OUTPATIENT)
Dept: HEALTH INFORMATION MANAGEMENT | Facility: CLINIC | Age: 59
End: 2024-11-06
Payer: COMMERCIAL

## 2024-11-14 ENCOUNTER — MEDICAL CORRESPONDENCE (OUTPATIENT)
Dept: HEALTH INFORMATION MANAGEMENT | Facility: CLINIC | Age: 59
End: 2024-11-14
Payer: COMMERCIAL

## 2024-11-14 ENCOUNTER — TRANSFERRED RECORDS (OUTPATIENT)
Dept: HEALTH INFORMATION MANAGEMENT | Facility: CLINIC | Age: 59
End: 2024-11-14
Payer: COMMERCIAL

## 2024-11-15 ENCOUNTER — TRANSCRIBE ORDERS (OUTPATIENT)
Dept: OTHER | Age: 59
End: 2024-11-15

## 2024-11-15 DIAGNOSIS — R93.1 ABNORMAL HEART SCORE CT: Primary | ICD-10-CM

## 2024-11-27 ENCOUNTER — OFFICE VISIT (OUTPATIENT)
Dept: CARDIOLOGY | Facility: CLINIC | Age: 59
End: 2024-11-27
Payer: COMMERCIAL

## 2024-11-27 VITALS
HEART RATE: 62 BPM | DIASTOLIC BLOOD PRESSURE: 70 MMHG | HEIGHT: 73 IN | WEIGHT: 186 LBS | BODY MASS INDEX: 24.65 KG/M2 | RESPIRATION RATE: 16 BRPM | SYSTOLIC BLOOD PRESSURE: 134 MMHG

## 2024-11-27 DIAGNOSIS — R93.1 ELEVATED CORONARY ARTERY CALCIUM SCORE: ICD-10-CM

## 2024-11-27 DIAGNOSIS — R93.1 ABNORMAL HEART SCORE CT: ICD-10-CM

## 2024-11-27 DIAGNOSIS — I25.10 CORONARY ARTERY DISEASE INVOLVING NATIVE CORONARY ARTERY OF NATIVE HEART WITHOUT ANGINA PECTORIS: Primary | ICD-10-CM

## 2024-11-27 PROCEDURE — 99204 OFFICE O/P NEW MOD 45 MIN: CPT | Performed by: INTERNAL MEDICINE

## 2024-11-27 RX ORDER — ROSUVASTATIN CALCIUM 10 MG/1
10 TABLET, COATED ORAL DAILY
COMMUNITY
Start: 2024-11-14

## 2024-11-27 RX ORDER — ASPIRIN 81 MG/1
81 TABLET ORAL DAILY
COMMUNITY

## 2024-11-27 NOTE — LETTER
11/27/2024    Holger Mock MD  [address]  [place] [address]    RE: Simon Bowers       Dear Colleague,     I had the pleasure of seeing Simon Bowers in the [facility].      [facility]  [phone]          Assessment/Recommendations   Patient with known mild hyperlipidemia, recent calcium score of 350 who is physically active, has an excellent diet, is a non-smoker and not treated for hypertension.  He does have some second-degree relatives on his mother side who have had significant coronary artery disease.    We talked about stable plaque versus unstable plaque today.  I agree with an aggressive approach to his LDL cholesterol and would shoot for an LDL cholesterol of less than 70.  He was recently started on rosuvastatin 10 mg a day and he will have a repeat lipid panel in about 3 months with Dr. Mock.    His blood sugar was slightly elevated and when he comes in for repeat study, would consider hemoglobin A1c for diabetic screening.    We talked about reducing carbohydrates in his diet and there may be some opportunities in this regard for him.    Dr. Mock has asked him to take a baby aspirin each day which I agree with that as well.    Because of his desire to be quite vigorous with his physical activity, and significant elevation in coronary calcium score, I have recommended a stress echocardiogram to rule out physiologically significant epicardial coronary artery disease.  Patient is agreeable.    We will get back to him with results of above studies and any further recommendations.       History of Present Illness/Subjective    Mr. Simon Bowers is a 59 year old male with known elevated calcium score which was recently done was 350.  Calcium was dispersed among the 3 coronary arteries and in the left main was 0.  Patient is physically active.  He walks 3 to 5 miles almost every day.  He does some light weight work and does bicycle in the  "summertime significantly.  He biked to 100 miles to a family cabin this summer with his son without difficulty.  He denies unusual shortness of breath with activity, orthopnea, paroxysmal nocturnal dyspnea, peripheral edema, chest pain or chest pressure.  He has no history of palpitations, syncope or near syncopal episodes and has no history of rheumatic fever, heart murmur, cerebrovascular accident or TIA.    Patient is not diabetic.  His mother's side of the family had significant heart problems with his grandfather on that side having bypass surgery and multiple uncles having myocardial infarctions in their 60s.  Patient's sister is taking a statin, brother is taking a statin and another brother has hypertension.    The patient does not smoke and has not been treated for hypertension.    The patient grew up in [place] [facility] [place].  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  He is .       Physical Examination Review of Systems   /70 (BP Location: Right arm, Patient Position: Sitting, Cuff Size: Adult Large)   Pulse 62   Resp 16   Ht 1.854 m (6' 1\")   Wt 84.4 kg (186 lb)   BMI 24.54 kg/m    Body mass index is 24.54 kg/m .  Wt Readings from Last 3 Encounters:   11/27/24 84.4 kg (186 lb)   07/02/24 85.7 kg (189 lb)   06/24/24 85.7 kg (189 lb)     General Appearance:   Alert, cooperative and in no acute distress.   ENT/Mouth: Pink/moist oral mucosa   EYES:  no scleral icterus, normal conjunctivae   Neck: JVP normal. No Hepatojugular reflux. Thyroid not visualized.   Chest/Lungs:   Lungs are clear to auscultation, equal chest wall expansion.   Cardiovascular:   S1, S2 without murmur ,clicks or rubs. Brachial, radial pulses are intact and symetric.  Left posterior tibial pulses slightly diminished compared to the right.  No carotid bruits noted   Abdomen:  Nontender. " "BS+. No bruits.   Extremities: No cyanosis, clubbing or edema   Skin: no xanthelasma, warm.    Neurologic: normal arm movement bilateral, no tremors     Psychiatric: Appropriate affect.      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 01/01/2009     Nonsenile cataract post surgery    after retinal surgery     Retinal detachment 02/05/2024    had surgery    Past Surgical History:   Procedure Laterality Date     HC KNEE SCOPE,MED/LAT MENISECTOMY Left 11/11/2009     HC UGI ENDOSCOPY DIAG W OR W/O BRUSH/WASH  05/25/2011     KNEE ARTHROSCOPY AND ARTHROTOMY Right 2018     PHACOEMULSIFICATION CLEAR CORNEA WITH STANDARD INTRAOCULAR LENS IMPLANT Right 7/2/2024    Procedure: RIGHT EYE PHACOEMULSIFICATION, CATARACT, WITH INTRAOCULAR LENS IMPLANT;  Surgeon: Gabriella Saldaña MD;  Location: [facility] OR     RETINAL REATTACHMENT  see above     VASECTOMY      Family History   Problem Relation Age of Onset     Hypertension Father      Cancer Father         Esophageal Cancer     C.A.D. Maternal Grandfather         Bypass     Diabetes Maternal Grandfather      Alzheimer Disease Maternal Grandfather         at late age     Neurologic Disorder Maternal Grandfather         Parkinsons at late age     Lipids Sister      Eye Disorder Other         MOTHERS SIDE- GLAUCOMA     Asthma No family hx of      Breast Cancer No family hx of      Cancer - colorectal No family hx of      Prostate Cancer No family hx of      Thyroid Disease No family hx of      Heart Disease No family hx of      Anesthesia Reaction No family hx of      Venous thrombosis No family hx of      Glaucoma No family hx of "      Macular Degeneration No family hx of     Social History     Socioeconomic History     Marital status:      Spouse name: Dasia     Number of children: 3     Years of education: 18     Highest education level: Not on file   Occupational History     Employer: [employer]   Tobacco Use     Smoking status: Never     Passive exposure: Never     Smokeless tobacco: Never     Tobacco comments:     No 2nd hand   Substance and Sexual Activity     Alcohol use: Yes     Comment: BEER/1-2 X PER MONTH or more     Drug use: No     Sexual activity: Yes     Partners: Female     Birth control/protection: Male Surgical   Other Topics Concern      Service No     Blood Transfusions No     Caffeine Concern No     Comment: none     Occupational Exposure Not Asked     Hobby Hazards Not Asked     Sleep Concern Not Asked     Stress Concern Not Asked     Weight Concern Not Asked     Special Diet Not Asked     Back Care Not Asked     Exercise Yes     Comment: runs     Bike Helmet Not Asked     Seat Belt Yes     Self-Exams Not Asked   Social History Narrative     Not on file     Social Drivers of Health     Financial Resource Strain: Low Risk  (1/22/2024)    Financial Resource Strain      Within the past 12 months, have you or your family members you live with been unable to get utilities (heat, electricity) when it was really needed?: No   Food Insecurity: Low Risk  (1/22/2024)    Food Insecurity      Within the past 12 months, did you worry that your food would run out before you got money to buy more?: No      Within the past 12 months, did the food you bought just not last and you didn t have money to get more?: No   Transportation Needs: Low Risk  (1/22/2024)    Transportation Needs      Within the past 12 months, has lack of transportation kept you from medical appointments, getting your medicines, non-medical meetings or appointments, work, or from getting things that you need?: No   Physical Activity:  Not on file   Stress: Not on file   Social Connections: Not on file   Interpersonal Safety: Low Risk  (1/22/2024)    Interpersonal Safety      Do you feel physically and emotionally safe where you currently live?: Yes      Within the past 12 months, have you been hit, slapped, kicked or otherwise physically hurt by someone?: No      Within the past 12 months, have you been humiliated or emotionally abused in other ways by your partner or ex-partner?: No   Housing Stability: Low Risk  (1/22/2024)    Housing Stability      Do you have housing? : Yes      Are you worried about losing your housing?: No          Medications  Allergies   Current Outpatient Medications   Medication Sig Dispense Refill     aspirin 81 MG EC tablet Take 81 mg by mouth daily.       rosuvastatin (CRESTOR) 10 MG tablet Take 10 mg by mouth daily.       ketorolac (ACULAR) 0.5 % ophthalmic solution Place 1 drop into the right eye 4 times daily 10 mL 3     prednisoLONE acetate (PRED FORTE) 1 % ophthalmic suspension Place 1-2 drops into the right eye 3 times daily 15 mL 3    Allergies   Allergen Reactions     No Known Drug Allergy      Seasonal Allergies      Not affecting him anymore         Lab Results    Chemistry/lipid CBC Cardiac Enzymes/BNP/TSH/INR   Lab Results   Component Value Date    CHOL 200 (H) 11/05/2024    HDL 35 (L) 11/05/2024    TRIG 138 11/05/2024    BUN 12.3 11/05/2024     11/05/2024    CO2 26 11/05/2024    Lab Results   Component Value Date    WBC 5.1 12/03/2004    HGB 14.8 11/06/2009    HCT 41.7 12/03/2004    MCV 85 12/03/2004     12/03/2004    Lab Results   Component Value Date    TSH 0.69 07/11/2012                                                Thank you for allowing me to participate in the care of your patient.      Sincerely,     Reji Carranza MD     [facility]  cc:   Holger Mock MD  [address] [place] [address]

## 2024-11-27 NOTE — PROGRESS NOTES
[facility]  [phone]          Assessment/Recommendations   Patient with known mild hyperlipidemia, recent calcium score of 350 who is physically active, has an excellent diet, is a non-smoker and not treated for hypertension.  He does have some second-degree relatives on his mother side who have had significant coronary artery disease.    We talked about stable plaque versus unstable plaque today.  I agree with an aggressive approach to his LDL cholesterol and would shoot for an LDL cholesterol of less than 70.  He was recently started on rosuvastatin 10 mg a day and he will have a repeat lipid panel in about 3 months with Dr. Mock.    His blood sugar was slightly elevated and when he comes in for repeat study, would consider hemoglobin A1c for diabetic screening.    We talked about reducing carbohydrates in his diet and there may be some opportunities in this regard for him.    Dr. Mock has asked him to take a baby aspirin each day which I agree with that as well.    Because of his desire to be quite vigorous with his physical activity, and significant elevation in coronary calcium score, I have recommended a stress echocardiogram to rule out physiologically significant epicardial coronary artery disease.  Patient is agreeable.    We will get back to him with results of above studies and any further recommendations.       History of Present Illness/Subjective    Mr. Simon Bowers is a 59 year old male with known elevated calcium score which was recently done was 350.  Calcium was dispersed among the 3 coronary arteries and in the left main was 0.  Patient is physically active.  He walks 3 to 5 miles almost every day.  He does some light weight work and does bicycle in the summertime significantly.  He biked to 100 miles to a family cabin this summer with his son without difficulty.  He denies unusual shortness of breath with activity, orthopnea, paroxysmal nocturnal dyspnea,  "peripheral edema, chest pain or chest pressure.  He has no history of palpitations, syncope or near syncopal episodes and has no history of rheumatic fever, heart murmur, cerebrovascular accident or TIA.    Patient is not diabetic.  His mother's side of the family had significant heart problems with his grandfather on that side having bypass surgery and multiple uncles having myocardial infarctions in their 60s.  Patient's sister is taking a statin, brother is taking a statin and another brother has hypertension.    The patient does not smoke and has not been treated for hypertension.    The patient grew up in [place].  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  He is .       Physical Examination Review of Systems   /70 (BP Location: Right arm, Patient Position: Sitting, Cuff Size: Adult Large)   Pulse 62   Resp 16   Ht 1.854 m (6' 1\")   Wt 84.4 kg (186 lb)   BMI 24.54 kg/m    Body mass index is 24.54 kg/m .  Wt Readings from Last 3 Encounters:   11/27/24 84.4 kg (186 lb)   07/02/24 85.7 kg (189 lb)   06/24/24 85.7 kg (189 lb)     General Appearance:   Alert, cooperative and in no acute distress.   ENT/Mouth: Pink/moist oral mucosa   EYES:  no scleral icterus, normal conjunctivae   Neck: JVP normal. No Hepatojugular reflux. Thyroid not visualized.   Chest/Lungs:   Lungs are clear to auscultation, equal chest wall expansion.   Cardiovascular:   S1, S2 without murmur ,clicks or rubs. Brachial, radial pulses are intact and symetric.  Left posterior tibial pulses slightly diminished compared to the right.  No carotid bruits noted   Abdomen:  Nontender. BS+. No bruits.   Extremities: No cyanosis, clubbing or edema   Skin: no xanthelasma, warm.    Neurologic: normal arm movement bilateral, no tremors     Psychiatric: Appropriate affect.      Encounter " "Vitals  BP: 134/70  Pulse: 62  Resp: 16  Weight: 84.4 kg (186 lb)  Height: 185.4 cm (6' 1\")                                           Medical History  Surgical History Family History Social History   Past Medical History:   Diagnosis Date    Bell's palsy 01/01/2009    History of chronic left facial numbness, probably secondary to Bell's     History of paresthesias evaluated at [place] and reassured. 01/01/2009    Nonsenile cataract post surgery    after retinal surgery    Retinal detachment 02/05/2024    had surgery    Past Surgical History:   Procedure Laterality Date    HC KNEE SCOPE,MED/LAT MENISECTOMY Left 11/11/2009    HC UGI ENDOSCOPY DIAG W OR W/O BRUSH/WASH  05/25/2011    KNEE ARTHROSCOPY AND ARTHROTOMY Right 2018    PHACOEMULSIFICATION CLEAR CORNEA WITH STANDARD INTRAOCULAR LENS IMPLANT Right 7/2/2024    Procedure: RIGHT EYE PHACOEMULSIFICATION, CATARACT, WITH INTRAOCULAR LENS IMPLANT;  Surgeon: Gabriella Saldaña MD;  Location: [facility] OR    RETINAL REATTACHMENT  see above    VASECTOMY      Family History   Problem Relation Age of Onset    Hypertension Father     Cancer Father         Esophageal Cancer    C.A.D. Maternal Grandfather         Bypass    Diabetes Maternal Grandfather     Alzheimer Disease Maternal Grandfather         at late age    Neurologic Disorder Maternal Grandfather         Parkinsons at late age    Lipids Sister     Eye Disorder Other         MOTHERS SIDE- GLAUCOMA    Asthma No family hx of     Breast Cancer No family hx of     Cancer - colorectal No family hx of     Prostate Cancer No family hx of     Thyroid Disease No family hx of     Heart Disease No family hx of     Anesthesia Reaction No family hx of     Venous thrombosis No family hx of     Glaucoma No family hx of     Macular Degeneration No family hx of     Social History     Socioeconomic History    Marital status:      Spouse name: Dasia    Number of children: 3    Years of education: 18    Highest education level: Not on file "   Occupational History     Employer: [employer]   Tobacco Use    Smoking status: Never     Passive exposure: Never    Smokeless tobacco: Never    Tobacco comments:     No 2nd hand   Substance and Sexual Activity    Alcohol use: Yes     Comment: BEER/1-2 X PER MONTH or more    Drug use: No    Sexual activity: Yes     Partners: Female     Birth control/protection: Male Surgical   Other Topics Concern     Service No    Blood Transfusions No    Caffeine Concern No     Comment: none    Occupational Exposure Not Asked    Hobby Hazards Not Asked    Sleep Concern Not Asked    Stress Concern Not Asked    Weight Concern Not Asked    Special Diet Not Asked    Back Care Not Asked    Exercise Yes     Comment: runs    Bike Helmet Not Asked    Seat Belt Yes    Self-Exams Not Asked   Social History Narrative    Not on file     Social Drivers of Health     Financial Resource Strain: Low Risk  (1/22/2024)    Financial Resource Strain     Within the past 12 months, have you or your family members you live with been unable to get utilities (heat, electricity) when it was really needed?: No   Food Insecurity: Low Risk  (1/22/2024)    Food Insecurity     Within the past 12 months, did you worry that your food would run out before you got money to buy more?: No     Within the past 12 months, did the food you bought just not last and you didn t have money to get more?: No   Transportation Needs: Low Risk  (1/22/2024)    Transportation Needs     Within the past 12 months, has lack of transportation kept you from medical appointments, getting your medicines, non-medical meetings or appointments, work, or from getting things that you need?: No   Physical Activity: Not on file   Stress: Not on file   Social Connections: Not on file   Interpersonal Safety: Low Risk  (1/22/2024)    Interpersonal Safety     Do you feel physically and emotionally safe where you currently live?: Yes     Within the past 12 months, have you  been hit, slapped, kicked or otherwise physically hurt by someone?: No     Within the past 12 months, have you been humiliated or emotionally abused in other ways by your partner or ex-partner?: No   Housing Stability: Low Risk  (1/22/2024)    Housing Stability     Do you have housing? : Yes     Are you worried about losing your housing?: No          Medications  Allergies   Current Outpatient Medications   Medication Sig Dispense Refill    aspirin 81 MG EC tablet Take 81 mg by mouth daily.      rosuvastatin (CRESTOR) 10 MG tablet Take 10 mg by mouth daily.      ketorolac (ACULAR) 0.5 % ophthalmic solution Place 1 drop into the right eye 4 times daily 10 mL 3    prednisoLONE acetate (PRED FORTE) 1 % ophthalmic suspension Place 1-2 drops into the right eye 3 times daily 15 mL 3    Allergies   Allergen Reactions    No Known Drug Allergy     Seasonal Allergies      Not affecting him anymore         Lab Results    Chemistry/lipid CBC Cardiac Enzymes/BNP/TSH/INR   Lab Results   Component Value Date    CHOL 200 (H) 11/05/2024    HDL 35 (L) 11/05/2024    TRIG 138 11/05/2024    BUN 12.3 11/05/2024     11/05/2024    CO2 26 11/05/2024    Lab Results   Component Value Date    WBC 5.1 12/03/2004    HGB 14.8 11/06/2009    HCT 41.7 12/03/2004    MCV 85 12/03/2004     12/03/2004    Lab Results   Component Value Date    TSH 0.69 07/11/2012

## 2024-12-02 DIAGNOSIS — H43.392 VITREOUS SYNERESIS OF LEFT EYE: Primary | ICD-10-CM

## 2024-12-06 ENCOUNTER — HOSPITAL ENCOUNTER (OUTPATIENT)
Dept: CARDIOLOGY | Facility: HOSPITAL | Age: 59
Discharge: HOME | End: 2024-12-06
Attending: INTERNAL MEDICINE | Admitting: INTERNAL MEDICINE
Payer: COMMERCIAL

## 2024-12-06 DIAGNOSIS — I25.10 CORONARY ARTERY DISEASE INVOLVING NATIVE CORONARY ARTERY OF NATIVE HEART WITHOUT ANGINA PECTORIS: ICD-10-CM

## 2024-12-06 DIAGNOSIS — R93.1 ABNORMAL HEART SCORE CT: ICD-10-CM

## 2024-12-06 DIAGNOSIS — R93.1 ELEVATED CORONARY ARTERY CALCIUM SCORE: ICD-10-CM

## 2024-12-06 LAB
CV STRESS CURRENT BP HE: NORMAL
CV STRESS CURRENT HR HE: 100
CV STRESS CURRENT HR HE: 105
CV STRESS CURRENT HR HE: 109
CV STRESS CURRENT HR HE: 110
CV STRESS CURRENT HR HE: 119
CV STRESS CURRENT HR HE: 125
CV STRESS CURRENT HR HE: 125
CV STRESS CURRENT HR HE: 126
CV STRESS CURRENT HR HE: 132
CV STRESS CURRENT HR HE: 135
CV STRESS CURRENT HR HE: 136
CV STRESS CURRENT HR HE: 145
CV STRESS CURRENT HR HE: 150
CV STRESS CURRENT HR HE: 150
CV STRESS CURRENT HR HE: 156
CV STRESS CURRENT HR HE: 159
CV STRESS CURRENT HR HE: 163
CV STRESS CURRENT HR HE: 164
CV STRESS CURRENT HR HE: 170
CV STRESS CURRENT HR HE: 179
CV STRESS CURRENT HR HE: 79
CV STRESS CURRENT HR HE: 90
CV STRESS CURRENT HR HE: 90
CV STRESS CURRENT HR HE: 91
CV STRESS CURRENT HR HE: 92
CV STRESS CURRENT HR HE: 93
CV STRESS CURRENT HR HE: 94
CV STRESS CURRENT HR HE: 95
CV STRESS CURRENT HR HE: 98
CV STRESS DEVIATION TIME HE: NORMAL
CV STRESS ECHO PERCENT HR HE: NORMAL
CV STRESS EXERCISE STAGE HE: NORMAL
CV STRESS EXERCISE STAGE REACHED HE: NORMAL
CV STRESS FINAL RESTING BP HE: NORMAL
CV STRESS FINAL RESTING HR HE: 93
CV STRESS MAX HR HE: 179
CV STRESS MAX TREADMILL GRADE HE: 16
CV STRESS MAX TREADMILL SPEED HE: 4.2
CV STRESS PEAK DIA BP HE: NORMAL
CV STRESS PEAK SYS BP HE: NORMAL
CV STRESS PHASE HE: NORMAL
CV STRESS PROTOCOL HE: NORMAL
CV STRESS REASON STOPPED HE: NORMAL
CV STRESS RESTING PT POSITION HE: NORMAL
CV STRESS RESTING PT POSITION HE: NORMAL
CV STRESS ST DEVIATION AMOUNT HE: NORMAL
CV STRESS ST DEVIATION ELEVATION HE: NORMAL
CV STRESS ST EVELATION AMOUNT HE: NORMAL
CV STRESS SYMPTOMS HE: NORMAL
CV STRESS TEST TYPE HE: NORMAL
CV STRESS TOTAL STAGE TIME MIN 1 HE: NORMAL
STRESS ECHO BASELINE DIASTOLIC HE: 81
STRESS ECHO BASELINE HR: 79
STRESS ECHO BASELINE SYSTOLIC BP: 159
STRESS ECHO LAST STRESS DIASTOLIC BP: 80
STRESS ECHO LAST STRESS HR: 163
STRESS ECHO LAST STRESS SYSTOLIC BP: 182
STRESS ECHO POST ESTIMATED WORKLOAD: 10.9
STRESS ECHO POST EXERCISE DUR MIN: 9
STRESS ECHO POST EXERCISE DUR SEC: 20
STRESS ECHO TARGET HR: 135

## 2024-12-06 PROCEDURE — 93016 CV STRESS TEST SUPVJ ONLY: CPT | Performed by: GENERAL ACUTE CARE HOSPITAL

## 2024-12-06 PROCEDURE — 93321 DOPPLER ECHO F-UP/LMTD STD: CPT | Mod: 26 | Performed by: INTERNAL MEDICINE

## 2024-12-06 PROCEDURE — 93325 DOPPLER ECHO COLOR FLOW MAPG: CPT | Mod: TC

## 2024-12-06 PROCEDURE — 93350 STRESS TTE ONLY: CPT | Mod: 26 | Performed by: INTERNAL MEDICINE

## 2024-12-06 PROCEDURE — 93350 STRESS TTE ONLY: CPT | Mod: TC

## 2024-12-06 PROCEDURE — 93325 DOPPLER ECHO COLOR FLOW MAPG: CPT | Mod: 26 | Performed by: INTERNAL MEDICINE

## 2024-12-06 PROCEDURE — 93018 CV STRESS TEST I&R ONLY: CPT | Performed by: INTERNAL MEDICINE

## 2024-12-09 ENCOUNTER — OFFICE VISIT (OUTPATIENT)
Dept: OPHTHALMOLOGY | Facility: CLINIC | Age: 59
End: 2024-12-09
Payer: COMMERCIAL

## 2024-12-09 DIAGNOSIS — H53.451 LOSS OF PERIPHERAL VISUAL FIELD, RIGHT: ICD-10-CM

## 2024-12-09 DIAGNOSIS — H43.391 FLOATERS IN VISUAL FIELD, RIGHT: ICD-10-CM

## 2024-12-09 PROCEDURE — G0463 HOSPITAL OUTPT CLINIC VISIT: HCPCS

## 2024-12-09 ASSESSMENT — SLIT LAMP EXAM - LIDS
COMMENTS: NORMAL
COMMENTS: NORMAL

## 2024-12-09 ASSESSMENT — VISUAL ACUITY
OD_CC: 20/20
METHOD: SNELLEN - LINEAR
OS_CC: 20/20
CORRECTION_TYPE: GLASSES

## 2024-12-09 ASSESSMENT — CUP TO DISC RATIO: OD_RATIO: SMALL

## 2024-12-09 ASSESSMENT — REFRACTION_WEARINGRX
OD_AXIS: 118
OD_ADD: +2.50
OS_CYLINDER: SPHERE
OD_CYLINDER: +1.25
OD_SPHERE: -1.00
OS_ADD: +2.50
OS_SPHERE: PLANO

## 2024-12-09 ASSESSMENT — TONOMETRY
IOP_METHOD: TONOPEN
OS_IOP_MMHG: 13
OD_IOP_MMHG: 14

## 2024-12-09 ASSESSMENT — EXTERNAL EXAM - RIGHT EYE: OD_EXAM: NORMAL

## 2024-12-09 NOTE — NURSING NOTE
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

## 2024-12-09 NOTE — PROGRESS NOTES
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  I personally obtained the chief complaint(s) and history of present illness.  I confirmed and edited as necessary the review of systems, past medical/surgical history, family history, social history, and examination findings as documented by others; and I examined the patient myself.  I personally reviewed the relevant tests, images, and reports as documented above.  I formulated and edited as necessary the assessment and plan and discussed the findings and management plan with the patient and family. I personally reviewed the ophthalmic test(s) associated with this encounter, agree with the interpretation(s) as documented by the resident/fellow, and have edited the corresponding report(s) as necessary. - Gabriella Steele MD

## 2025-01-09 DIAGNOSIS — H43.392 VITREOUS SYNERESIS OF LEFT EYE: Primary | ICD-10-CM

## 2025-01-20 ENCOUNTER — OFFICE VISIT (OUTPATIENT)
Dept: OPHTHALMOLOGY | Facility: CLINIC | Age: 60
End: 2025-01-20
Payer: COMMERCIAL

## 2025-01-20 DIAGNOSIS — H26.491 POSTERIOR CAPSULAR OPACIFICATION, RIGHT EYE: Primary | ICD-10-CM

## 2025-01-20 DIAGNOSIS — H43.392 VITREOUS SYNERESIS OF LEFT EYE: ICD-10-CM

## 2025-01-20 PROCEDURE — 99207 PR DROP WITH A PROCEDURE: CPT

## 2025-01-20 PROCEDURE — 92134 CPTRZ OPH DX IMG PST SGM RTA: CPT

## 2025-01-20 PROCEDURE — 66821 AFTER CATARACT LASER SURGERY: CPT | Mod: RT

## 2025-01-20 ASSESSMENT — REFRACTION_WEARINGRX
OD_SPHERE: -1.00
OD_CYLINDER: +1.25
OD_ADD: +2.50
OS_ADD: +2.50
OS_SPHERE: PLANO
OS_CYLINDER: SPHERE
OD_AXIS: 118

## 2025-01-20 ASSESSMENT — VISUAL ACUITY
OS_CC: 20/20
OD_CC: 20/25
CORRECTION_TYPE: GLASSES
METHOD: SNELLEN - LINEAR
OD_CC+: +2

## 2025-01-20 ASSESSMENT — TONOMETRY
IOP_METHOD: TONOPEN
OS_IOP_MMHG: --
IOP_METHOD: TONOPEN
OS_IOP_MMHG: 16
OD_IOP_MMHG: 15
OD_IOP_MMHG: 13

## 2025-01-20 NOTE — PROGRESS NOTES
CC: post cataract    HPI: blurred vision RIGHt eye, not improved with glasses    PMHx:   NA      Imaging:  OCT: 06/14/2024  Right eye: attached, good lamination, no IRF, epiretinal membrane   Left eye: Good foveal contour, no IRF/SRF     Assessment/ Plan: 06/14/2024       Pseudophakia OD (Jacques Steele 7/2/24)  Hx [place]-Nia Syndrome OD-resolved  - 08/01/24 POM1 has CME OD foveal-resolved   - doing great  - there is PCO  - r/b/a to YAG capsulotomy discussed with the patient. 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  I personally obtained the chief complaint(s) and history of present illness.  I confirmed and edited as necessary the review of systems, past medical/surgical history, family history, social history, and examination findings as documented by others; and I examined the patient myself.  I personally reviewed the relevant tests, images, and reports as documented above.  I formulated and edited as necessary the assessment and plan and discussed the findings and management plan with the patient and family. I personally reviewed the ophthalmic test(s) associated with this encounter, agree with the interpretation(s) as documented by the resident/fellow, and have edited the corresponding report(s) as necessary. - Gabriella Steele MD

## 2025-01-20 NOTE — NURSING NOTE
Chief Complaints and History of Present Illnesses   Patient presents with    vitreous syneresis               Chief Complaint(s) and History of Present Illness(es)       vitreous syneresis               Comments:                   Comments    Vision more foggy od.   Denies new flashes, floaters, curtain, pain.   Denies ocular meds.     Joyce GANNON, January 20, 2025 12:53 PM

## 2025-01-22 ASSESSMENT — CUP TO DISC RATIO: OD_RATIO: SMALL

## 2025-01-22 ASSESSMENT — SLIT LAMP EXAM - LIDS
COMMENTS: NORMAL
COMMENTS: NORMAL

## 2025-01-22 ASSESSMENT — EXTERNAL EXAM - RIGHT EYE: OD_EXAM: NORMAL

## 2025-01-27 ENCOUNTER — OFFICE VISIT (OUTPATIENT)
Dept: OPHTHALMOLOGY | Facility: CLINIC | Age: 60
End: 2025-01-27
Payer: COMMERCIAL

## 2025-01-27 DIAGNOSIS — Z98.890 POST-OPERATIVE STATE: Primary | ICD-10-CM

## 2025-01-27 PROCEDURE — G0463 HOSPITAL OUTPT CLINIC VISIT: HCPCS

## 2025-01-27 ASSESSMENT — REFRACTION_WEARINGRX
OS_ADD: +2.50
OS_CYLINDER: SPHERE
OD_AXIS: 118
OD_ADD: +2.50
OD_SPHERE: -1.00
OD_CYLINDER: +1.25
OS_SPHERE: PLANO

## 2025-01-27 ASSESSMENT — VISUAL ACUITY
OD_SC+: -1
OD_SC: 20/20
METHOD: SNELLEN - LINEAR
OS_SC: 20/20

## 2025-01-27 ASSESSMENT — TONOMETRY
OD_IOP_MMHG: 12
OS_IOP_MMHG: 11
IOP_METHOD: TONOPEN

## 2025-01-27 ASSESSMENT — SLIT LAMP EXAM - LIDS
COMMENTS: NORMAL
COMMENTS: NORMAL

## 2025-01-27 ASSESSMENT — CUP TO DISC RATIO: OD_RATIO: SMALL

## 2025-01-27 ASSESSMENT — EXTERNAL EXAM - RIGHT EYE: OD_EXAM: NORMAL

## 2025-01-27 NOTE — PROGRESS NOTES
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  I personally obtained the chief complaint(s) and history of present illness.  I confirmed and edited as necessary the review of systems, past medical/surgical history, family history, social history, and examination findings as documented by others; and I examined the patient myself.  I personally reviewed the relevant tests, images, and reports as documented above.  I formulated and edited as necessary the assessment and plan and discussed the findings and management plan with the patient and family. I personally reviewed the ophthalmic test(s) associated with this encounter, agree with the interpretation(s) as documented by the resident/fellow, and have edited the corresponding report(s) as necessary. - Gabriella Steele MD

## 2025-01-27 NOTE — PROGRESS NOTES
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  I personally obtained the chief complaint(s) and history of present illness.  I confirmed and edited as necessary the review of systems, past medical/surgical history, family history, social history, and examination findings as documented by others; and I examined the patient myself.  I personally reviewed the relevant tests, images, and reports as documented above.  I formulated and edited as necessary the assessment and plan and discussed the findings and management plan with the patient and family. I personally reviewed the ophthalmic test(s) associated with this encounter, agree with the interpretation(s) as documented by the resident/fellow, and have edited the corresponding report(s) as necessary. - Gabriella Steele MD

## 2025-01-27 NOTE — NURSING NOTE
Chief Complaints and History of Present Illnesses   Patient presents with    Post Op (Ophthalmology) Right Eye     Chief Complaint(s) and History of Present Illness(es)       Post Op (Ophthalmology) Right Eye              Laterality: right eye    Onset: 1 week ago              Comments    1 week s/p Yag Cap RE-Pt. States that he is doing well. VA is much improved RE. Has been seeing one floater RE since laser. No pain or dryness BE.  Juany Coon COT 12:35 PM January 27, 2025

## 2025-02-27 ENCOUNTER — LAB REQUISITION (OUTPATIENT)
Dept: LAB | Facility: CLINIC | Age: 60
End: 2025-02-27

## 2025-02-27 DIAGNOSIS — E78.00 PURE HYPERCHOLESTEROLEMIA, UNSPECIFIED: ICD-10-CM

## 2025-02-27 LAB
ALBUMIN SERPL BCG-MCNC: 4.1 G/DL (ref 3.5–5.2)
ALP SERPL-CCNC: 59 U/L (ref 40–150)
ALT SERPL W P-5'-P-CCNC: 22 U/L (ref 0–70)
ANION GAP SERPL CALCULATED.3IONS-SCNC: 12 MMOL/L (ref 7–15)
AST SERPL W P-5'-P-CCNC: 23 U/L (ref 0–45)
BILIRUB SERPL-MCNC: 0.6 MG/DL
BUN SERPL-MCNC: 14.5 MG/DL (ref 8–23)
CALCIUM SERPL-MCNC: 9.6 MG/DL (ref 8.8–10.4)
CHLORIDE SERPL-SCNC: 103 MMOL/L (ref 98–107)
CHOLEST SERPL-MCNC: 81 MG/DL
CREAT SERPL-MCNC: 0.88 MG/DL (ref 0.67–1.17)
EGFRCR SERPLBLD CKD-EPI 2021: >90 ML/MIN/1.73M2
FASTING STATUS PATIENT QL REPORTED: YES
FASTING STATUS PATIENT QL REPORTED: YES
GLUCOSE SERPL-MCNC: 102 MG/DL (ref 70–99)
HCO3 SERPL-SCNC: 25 MMOL/L (ref 22–29)
HDLC SERPL-MCNC: 24 MG/DL
LDLC SERPL CALC-MCNC: 43 MG/DL
NONHDLC SERPL-MCNC: 57 MG/DL
POTASSIUM SERPL-SCNC: 4 MMOL/L (ref 3.4–5.3)
PROT SERPL-MCNC: 6.6 G/DL (ref 6.4–8.3)
SODIUM SERPL-SCNC: 140 MMOL/L (ref 135–145)
TRIGL SERPL-MCNC: 68 MG/DL

## 2025-02-27 PROCEDURE — 82040 ASSAY OF SERUM ALBUMIN: CPT | Performed by: FAMILY MEDICINE

## 2025-02-27 PROCEDURE — 80061 LIPID PANEL: CPT | Performed by: FAMILY MEDICINE

## 2025-02-27 PROCEDURE — 80051 ELECTROLYTE PANEL: CPT | Performed by: FAMILY MEDICINE

## 2025-02-27 PROCEDURE — 84155 ASSAY OF PROTEIN SERUM: CPT | Performed by: FAMILY MEDICINE

## 2025-06-17 ENCOUNTER — OFFICE VISIT (OUTPATIENT)
Dept: OPHTHALMOLOGY | Facility: CLINIC | Age: 60
End: 2025-06-17
Attending: OPHTHALMOLOGY
Payer: COMMERCIAL

## 2025-06-17 DIAGNOSIS — H43.822 VITREOMACULAR ADHESION OF LEFT EYE: ICD-10-CM

## 2025-06-17 DIAGNOSIS — Z86.69 HISTORY OF RETINAL DETACHMENT: ICD-10-CM

## 2025-06-17 DIAGNOSIS — H35.371 EPIRETINAL MEMBRANE (ERM) OF RIGHT EYE: ICD-10-CM

## 2025-06-17 DIAGNOSIS — H04.123 DRY EYES: ICD-10-CM

## 2025-06-17 DIAGNOSIS — H25.12 AGE-RELATED NUCLEAR CATARACT OF LEFT EYE: ICD-10-CM

## 2025-06-17 DIAGNOSIS — H43.392 VITREOUS SYNERESIS OF LEFT EYE: Primary | ICD-10-CM

## 2025-06-17 PROCEDURE — G0463 HOSPITAL OUTPT CLINIC VISIT: HCPCS | Performed by: OPHTHALMOLOGY

## 2025-06-17 PROCEDURE — 92134 CPTRZ OPH DX IMG PST SGM RTA: CPT | Performed by: OPHTHALMOLOGY

## 2025-06-17 ASSESSMENT — VISUAL ACUITY
OS_SC+: -2
METHOD: SNELLEN - LINEAR
OS_SC: 20/20
OD_SC: 20/20
OD_SC+: -1

## 2025-06-17 ASSESSMENT — CUP TO DISC RATIO
OD_RATIO: SMALL
OS_RATIO: 0.3

## 2025-06-17 ASSESSMENT — TONOMETRY
IOP_METHOD: TONOPEN
OD_IOP_MMHG: 13
OS_IOP_MMHG: 13

## 2025-06-17 ASSESSMENT — SLIT LAMP EXAM - LIDS
COMMENTS: NORMAL
COMMENTS: NORMAL

## 2025-06-17 ASSESSMENT — EXTERNAL EXAM - RIGHT EYE: OD_EXAM: NORMAL

## 2025-06-17 NOTE — NURSING NOTE
Chief Complaints and History of Present Illnesses   Patient presents with    Follow Up     PPV/SBP/SF6 for mac-on ST RD OD 3/21/24     Chief Complaint(s) and History of Present Illness(es)       Follow Up              Comments: PPV/SBP/SF6 for mac-on ST RD OD 3/21/24              Comments    Pt had cataract surgery right eye since last visit   Floaters and flashes left eye , not new   No eye pain or redness    Dasia Carmen COT 8:30 AM June 17, 2025

## 2025-06-17 NOTE — PROGRESS NOTES
"CC -   h/o RD OD    INTERVAL HISTORY - LV with me 6/2024, had CE/IOL OD with [facility] in 2024  occasional blurring OD when tired.      [facility] -   Simon Bowers is a  59 year old year-old patient with history of mac-on ST RD OD Dx 3/21/24  Fell while skiing ~ 3/4/24, noticed floaters & then VFD few days later worsening.  Seen at [facility] 3/20/24      PAST OCULAR SURGERY  PPV/SBP/SF6 OD 3/21/24  CE/IOL OD 2024 ([facility])  YAG OD        RETINAL IMAGING:  OCT 06/17/2025  OD-Trace ERM non-central, no fluid, [employer]  OS-Normal retina  PHF attached; VMA      ASSESSMENT & PLAN    # h/o RD OD   - mac-on ST RD Dx 3/21/24   - s/p PPV/SBP/SF6 OD 3/21/24   - retina flat   - Doing well    # ERM OD   - non-central NVS   - monitor    # NS OS    - VA good        # syneresis with VMA OS   - advised S/Sx RD 6/2025    # Pseudophakia OD   - capsule remnant OD behind IOL    - still has tag holding it in place to IOL   - happy with vision, minimal if any symptoms   - observe    # MILLICENT  - may be causing \"tired\" sensation   - ATs d/w patient 6/2025      # meridional fold OS    Return in about 1 year (around 6/17/2026) for DFE OU, OCT OU, [employer].       ATTESTATION     Attending Attestation:     Complete documentation of historical and exam elements from today's encounter can be found in the full encounter summary report (not reduplicated in this progress note).  I personally obtained the chief complaint(s) and history of present illness.  I confirmed and edited as necessary the review of systems, past medical/surgical history, family history, social history, and examination findings as documented by others; and I examined the patient myself.  I personally reviewed the relevant tests, images, and reports as documented above.  I formulated and edited as necessary the assessment and plan and discussed the findings and management plan with the patient and family    Carissa Hernandez MD, PhD  , Vitreoretinal Surgery  Department " of Ophthalmology  [facility]

## (undated) DEVICE — APPLICATORS COTTON TIP 6"X2 STERILE LF C15053-006

## (undated) DEVICE — EYE PROBE ESU DIATHERMY DSP 25GA 339.21

## (undated) DEVICE — GLOVE BIOGEL PI ULTRATOUCH SZ 6.5 41165

## (undated) DEVICE — NDL 18GA 1.5" FILTER

## (undated) DEVICE — CATARACT BLADE PACK 2.4MM 58001897

## (undated) DEVICE — PACK VITRECTOMY/RET CUSTOM ASC PQ15VRU12

## (undated) DEVICE — EYE CANN IRR 27GA ANTERIOR CHAMBER 581280

## (undated) DEVICE — DRSG EYE PAD STERILE 1.63X2.63" NON21600

## (undated) DEVICE — EYE SHIELD PLASTIC CLEAR UNIVERSAL K9-6050

## (undated) DEVICE — EYE SHIELD PLASTIC

## (undated) DEVICE — STRAP KNEE/BODY 31143004

## (undated) DEVICE — NDL 16GA 1.5" 305198

## (undated) DEVICE — SYR 05ML LL W/O NDL

## (undated) DEVICE — EYE COVER TONOPEN OCU FILM LATEX 230651-002

## (undated) DEVICE — TAPE TRANSPORE 1"X1.5YD 1534S-1

## (undated) DEVICE — EYE PACK CUSTOM ANTERIOR 30DEG TIP CENTURION PPK6682-04

## (undated) DEVICE — ESU CORD BIPOLAR GREEN 10-4000

## (undated) DEVICE — EYE CANN SOFT TIP 25GA FOR VALVED SET 8065149530

## (undated) DEVICE — LINEN TOWEL PACK X5 5464

## (undated) DEVICE — SU SILK 2-0 TIE 12X30" A305H

## (undated) DEVICE — SYR 01ML LL W/O NDL LATEX FREE 309628

## (undated) DEVICE — GLOVE BIOGEL PI MICRO SZ 7.5 48575

## (undated) DEVICE — POSITIONER ARMBOARD FOAM 1PAIR LF FP-ARMB1

## (undated) DEVICE — BLADE KNIFE BEAVER MICROSHARP GREEN 377515

## (undated) DEVICE — EYE CANN IRR 25GA HYDRODISSECTING 585037

## (undated) DEVICE — EYE GAS ISPAN SF6 PER USE/CC/ML 8065797005

## (undated) DEVICE — TAPE MICROPORE 1"X1.5YD 1530S-1

## (undated) DEVICE — PACK CATARACT CUSTOM ASC SEY15CPUMC

## (undated) DEVICE — DRAPE INCISE 51X51" 1060

## (undated) DEVICE — EYE ESU WET-FIELD ERASER BIPOLAR 25GA FINE STR TIP 221267

## (undated) DEVICE — SU PLAIN 6-0 TG140-8 18" 1735G

## (undated) DEVICE — EYE SOL BSS 500ML BAG 0065-1795-04

## (undated) DEVICE — EYE KIT AUTO GAS FOR CONSTELLATION 8065751014

## (undated) DEVICE — SU VICRYL 7-0 TG140-8DA 18" J546G

## (undated) DEVICE — EYE PREP BETADINE 5% SOLUTION 30ML 0065-0411-30

## (undated) DEVICE — EYE PACK 25GA CONSTELLATION 10,000 CPM PPK9380-04

## (undated) DEVICE — DRAPE EYE SHEET 8441

## (undated) DEVICE — EYE DRAPE MICROSCOPE UNIVERSAL (BIOM FULL) 08-MK55140

## (undated) DEVICE — EYE PROBE LASER 25GA FLEX RFID 8065751114

## (undated) DEVICE — TAPE MICROPORE 2"X1.5YD 1530S-2

## (undated) DEVICE — DRSG TEGADERM 4X4 3/4" 1626W

## (undated) DEVICE — EYE KNIFE CRESCENT STR 373808

## (undated) DEVICE — EYE TIP IRRIGATION & ASPIRATION POLYMER 35D BENT 8065751511

## (undated) DEVICE — SU ETHILON 5-0 RD-1DA 18" 749G

## (undated) DEVICE — SOL WATER IRRIG 500ML BOTTLE 2F7113

## (undated) RX ORDER — FENTANYL CITRATE 50 UG/ML
INJECTION, SOLUTION INTRAMUSCULAR; INTRAVENOUS
Status: DISPENSED
Start: 2024-03-21

## (undated) RX ORDER — KETOROLAC TROMETHAMINE 30 MG/ML
INJECTION, SOLUTION INTRAMUSCULAR; INTRAVENOUS
Status: DISPENSED
Start: 2024-03-21

## (undated) RX ORDER — FENTANYL CITRATE 50 UG/ML
INJECTION, SOLUTION INTRAMUSCULAR; INTRAVENOUS
Status: DISPENSED
Start: 2024-07-02

## (undated) RX ORDER — FENTANYL CITRATE-0.9 % NACL/PF 10 MCG/ML
PLASTIC BAG, INJECTION (ML) INTRAVENOUS
Status: DISPENSED
Start: 2024-03-21

## (undated) RX ORDER — CYCLOPENTOLAT/TROPIC/PHENYLEPH 1%-1%-2.5%
DROPS (EA) OPHTHALMIC (EYE)
Status: DISPENSED
Start: 2024-03-21